# Patient Record
Sex: FEMALE | Race: WHITE | NOT HISPANIC OR LATINO | URBAN - METROPOLITAN AREA
[De-identification: names, ages, dates, MRNs, and addresses within clinical notes are randomized per-mention and may not be internally consistent; named-entity substitution may affect disease eponyms.]

---

## 2017-04-03 ENCOUNTER — TRANSFERRED RECORDS (OUTPATIENT)
Dept: HEALTH INFORMATION MANAGEMENT | Facility: CLINIC | Age: 32
End: 2017-04-03

## 2017-05-05 PROCEDURE — 00000346 ZZHCL STATISTIC REVIEW OUTSIDE SLIDES TC 88321: Performed by: OBSTETRICS & GYNECOLOGY

## 2017-05-09 ASSESSMENT — ENCOUNTER SYMPTOMS
POOR WOUND HEALING: 0
SKIN CHANGES: 1
NAIL CHANGES: 0

## 2017-05-12 PROCEDURE — 00000346 ZZHCL STATISTIC REVIEW OUTSIDE SLIDES TC 88321: Performed by: OBSTETRICS & GYNECOLOGY

## 2017-05-15 LAB — COPATH REPORT: NORMAL

## 2017-05-18 ENCOUNTER — ONCOLOGY VISIT (OUTPATIENT)
Dept: ONCOLOGY | Facility: CLINIC | Age: 32
End: 2017-05-18
Attending: OBSTETRICS & GYNECOLOGY
Payer: COMMERCIAL

## 2017-05-18 VITALS
RESPIRATION RATE: 16 BRPM | TEMPERATURE: 97.9 F | SYSTOLIC BLOOD PRESSURE: 135 MMHG | HEART RATE: 74 BPM | OXYGEN SATURATION: 100 % | DIASTOLIC BLOOD PRESSURE: 83 MMHG | WEIGHT: 147.4 LBS

## 2017-05-18 DIAGNOSIS — N88.2 STENOSIS OF CERVIX UTERI: ICD-10-CM

## 2017-05-18 DIAGNOSIS — D06.9 ADENOCARCINOMA IN SITU (AIS) OF UTERINE CERVIX: Primary | ICD-10-CM

## 2017-05-18 PROCEDURE — 99204 OFFICE O/P NEW MOD 45 MIN: CPT | Mod: ZP | Performed by: OBSTETRICS & GYNECOLOGY

## 2017-05-18 PROCEDURE — 99213 OFFICE O/P EST LOW 20 MIN: CPT | Mod: ZF

## 2017-05-18 RX ORDER — CETIRIZINE HYDROCHLORIDE 10 MG/1
TABLET ORAL
COMMUNITY
Start: 2015-06-01

## 2017-05-18 RX ORDER — ACETAMINOPHEN 325 MG/1
975 TABLET ORAL ONCE
Status: CANCELLED | OUTPATIENT
Start: 2017-05-18 | End: 2017-05-18

## 2017-05-18 ASSESSMENT — PAIN SCALES - GENERAL: PAINLEVEL: NO PAIN (0)

## 2017-05-18 NOTE — NURSING NOTE
Oncology Rooming Note    May 18, 2017 10:04 AM   Cheyanne Ramos is a 32 year old female who presents for:    Chief Complaint   Patient presents with     Oncology Clinic Visit     abnormal pap     Initial Vitals: /83  Pulse 74  Temp 97.9  F (36.6  C) (Oral)  Resp 16  Wt 66.9 kg (147 lb 6.4 oz)  LMP  (Exact Date)  SpO2 100%  Breastfeeding? No There is no height or weight on file to calculate BMI. There is no height or weight on file to calculate BSA.  No Pain (0) Comment: Data Unavailable   No LMP recorded (exact date).  Allergies reviewed: Yes  Medications reviewed: Yes    Medications: Medication refills not needed today.  Pharmacy name entered into EPIC: Data Unavailable    Clinical concerns: Pt here to discuss previous abnormal pap, has been trying to get pregnant for 2 years without success. Dr Crow was notified.    8 minutes for nursing intake (face to face time)     Linda Garibay CMA

## 2017-05-18 NOTE — NURSING NOTE
Pre Op Nurse Teaching Template    Relevant Diagnosis: cervical stenosis    Teaching Topic: cervical dilatation under u/s guidance place daniel sleeve endocervical curettage    Person(s) involved in teaching :  Patient    Motivation Level:  Asks Questions:    Yes      Eager to Learn:     Yes     Cooperative:          Yes    Receptive (willing. Able to accept information):    Yes      Patient and those who are listed above demonstrates understanding of the following:   Reason for the appointment, diagnosis and treatment plan:   Yes   Knowledge of proper use of medications and conditions for which they are ordered (with special attention to potential side effects or drug interactions): Yes   Which situations necessitate calling provider and whom to contact: Yes         Nutritional needs and diet plan:  Yes      Pain management techniques:     Yes, Pain Scale   Diet:   Yes, [facility] Diet Instructions    Teaching Concerns addressed: Yes    Infection Prevention:  Patient and those who are listed above demonstrate understanding of the following:  Pre-Op CHG Bathing Instructions: Yes  Surgical procedure site care taught:   Yes   Signs and symptoms of infection taught: Yes       Instructional Materials Used/Given:  The [place] Before You Surgery Booklet  Showering or Bathing before Surgery Instructions & CHG Product  Hysterectomy Guidelines  Pain Assessment Tool   Home Care after Major Abdominal or Vaginal Surgery  Map  Accommodations Brochure  Phone numbers for [facility] and [facility]  Copy of Surgical Consent    Done Today:   Preop Visit not needed   Tests Ordered:  Post Op Visit Scheduled:  6/22  Comments:    Surgery date/time:6/15    Consent sent to file in medical records

## 2017-05-18 NOTE — MR AVS SNAPSHOT
After Visit Summary   5/18/2017    Cheyanne Ramos    MRN: [id number]           Patient Information     Date Of Birth          1985        Visit Information        Provider Department      5/18/2017 10:00 AM Key Crow MD [facility]        Today's Diagnoses     Adenocarcinoma in situ (AIS) of uterine cervix    -  1    Stenosis of cervix uteri           Follow-ups after your visit        Your next 10 appointments already scheduled     Jun 22, 2017  3:20 PM CDT   (Arrive by 3:05 PM)   Return Visit with Key Crow MD   [facility] ([facility])    [address]  [address]nd Floor  [place] [address] [phone]              Who to contact     If you have questions or need follow up information about today's clinic visit or your schedule please contact [facility] directly at [phone].  Normal or non-critical lab and imaging results will be communicated to you by MyChart, letter or phone within 4 business days after the clinic has received the results. If you do not hear from us within 7 days, please contact the clinic through [email or web address] or phone. If you have a critical or abnormal lab result, we will notify you by phone as soon as possible.  Submit refill requests through EMUZE or call your pharmacy and they will forward the refill request to us. Please allow 3 business days for your refill to be completed.          Additional Information About Your Visit        MyChart Information     EMUZE gives you secure access to your electronic health record. If you see a primary care provider, you can also send messages to your care team and make appointments. If you have questions, please call your primary care clinic.  If you do not have a primary care provider, please call [phone] and they will assist you.        Care EveryWhere ID     This is your Care EveryWhere ID. This could be used by  other organizations to access your [facility] medical records  [id number]        Your Vitals Were     Pulse Temperature Respirations Last Period Pulse Oximetry Breastfeeding?    74 97.9  F (36.6  C) (Oral) 16 (Exact Date) 100% No       Blood Pressure from Last 3 Encounters:   06/15/17 115/76   05/18/17 135/83    Weight from Last 3 Encounters:   06/15/17 63.5 kg (140 lb)   05/18/17 66.9 kg (147 lb 6.4 oz)              We Performed the Following     Bruna-Operative Worksheet (Blank)        Primary Care Provider    Unknown Primary MD Johnny       No address on file        Thank you!     Thank you for choosing [facility]  for your care. Our goal is always to provide you with excellent care. Hearing back from our patients is one way we can continue to improve our services. Please take a few minutes to complete the written survey that you may receive in the mail after your visit with us. Thank you!             Your Updated Medication List - Protect others around you: Learn how to safely use, store and throw away your medicines at www.disposemymeds.org.          This list is accurate as of: 5/18/17 11:59 PM.  Always use your most recent med list.                   Brand Name Dispense Instructions for use    cetirizine 10 MG tablet    zyrTEC         ibuprofen 600 MG tablet    ADVIL/MOTRIN    30 tablet    Take 1 tablet (600 mg) by mouth every 6 hours as needed for pain (mild)

## 2017-05-18 NOTE — LETTER
2017       RE: Cheyanne Ramos  [address] [address]     Dear Colleague,    Thank you for referring your patient, Cheyanne Ramos, to the [facility]. Please see a copy of my visit note below.                            Consult Notes on Referred Patient    Date: 2017       Dr. Racheal Ribera  [facility] [address]  [place] [address]       RE: Cheyanne Ramos  : 1985  LATHA: 2017     Dear Dr. Racheal Ribera:    I had the pleasure of seeing your patient Cheyanne Ramos here at the Gynecologic Cancer Clinic at the [facility] on 2017.  As you know she is a very pleasant 32 year old woman with a history of AIS and KIKE III in  removed on cold knife cone with negative margins. She has been followed with q 6 months colposcopy and pap smears since that time however it has been difficult to assess her endocervix given cervical stenosis post cold cone.  Given these findings she was subsequently sent to the Gynecologic Cancer Clinic for new patient consultation.     Gynecologic Oncology History:   2014: Pap smear with HSIL, Cold knife cone on 9/15/17 demonstrated AIS with KIKE III, endocervical and ectocervical margins free. EMB at that time showed benign endometrium. Since has been followed with q 6 colposcopy and pap smear. ECC unable to be obtained due to cervical stenosis post CKC.     Today: Feeling well, no abdominal pain, bloating, swelling, nausea or vomiting. No issues, has never had abnormal bleeding since her abnormal pap smear/cold knife cone. Would like to discuss if pregnancy is something she should continue pursuing or consider hysterectomy.     Obstectric and Gynecologic History:  Pap smears: Normal until  when noted to have HSIL. Since CKC in  has had normal pap smears and colposcopy q 6 months.   Menstrual History: Menarche at age 14, regular menses since every 28 days. No bleeding with intercourse. Has  had regular periods since her [facility].     Review of Systems:  Answers for HPI/ROS submitted by the patient on 5/9/2017   General Symptoms: No  Skin Symptoms: Yes  HENT Symptoms: No  EYE SYMPTOMS: No  HEART SYMPTOMS: No  LUNG SYMPTOMS: No  INTESTINAL SYMPTOMS: No  URINARY SYMPTOMS: No  GYNECOLOGIC SYMPTOMS: No  BREAST SYMPTOMS: No  SKELETAL SYMPTOMS: No  BLOOD SYMPTOMS: No  NERVOUS SYSTEM SYMPTOMS: No  MENTAL HEALTH SYMPTOMS: No  Changes in hair: No  Changes in moles/birth marks: Yes  Itching: No  Rashes: No  Changes in nails: No  Acne: No  Hair in places you don't want it: No  Change in facial hair: No  Warts: No  Non-healing sores: No  Scarring: No  Flaking of skin: No  Color changes of hands/feet in cold : No  Sun sensitivity: No  Skin thickening: No      Past Medical History:    No past medical history on file.      Past Surgical History:    No past surgical history on file.      Health Maintenance:  Health Maintenance Due   Topic Date Due     TETANUS IMMUNIZATION (SYSTEM ASSIGNED)  04/13/2005     PAP SCREENING Q3 YR (SYSTEM ASSIGNED)  04/13/2008       Last Pap Smear: No results found for: PAP  She has had a history of abnormal Pap smears.    Current Medications:     currently has no medications in their medication list.       Allergies:      Allergies not on file        Social History:     Social History   Substance Use Topics     Smoking status: Not on file     Smokeless tobacco: Not on file     Alcohol use Not on file       History   Drug Use Not on file           Family History:     The patient's family history is unremarkable.    No family history on file.      Physical Exam:     There were no vitals taken for this visit.  There is no height or weight on file to calculate BMI.    General Appearance: healthy and alert, no distress     HEENT:  no thyromegaly, no palpable nodules or masses        Cardiovascular: regular rate and rhythm, no gallops, rubs or murmurs     Respiratory: lungs clear, no rales, rhonchi or  wheezes, normal diaphragmatic excursion    Musculoskeletal: extremities non tender and without edema    Skin: no lesions or rashes     Neurological: normal gait, no gross defects     Psychiatric: appropriate mood and affect                               Hematological: normal cervical, supraclavicular and inguinal lymph nodes     Gastrointestinal:       abdomen soft, non-tender, non-distended, no organomegaly or masses    Genitourinary: External genitalia and urethral meatus appears normal.  Vagina is smooth without nodularity or masses.  Cervix appears stenotic, slight leftward deviation. Bimanual exam reveal no masses, nodularity or fullness.  Recto-vaginal exam confirms these findings.       Assessment:    Cheyanne Ramos is a 32 year old woman with a history of AIS, KIKE III diagnosed on [facility] 9/2014 with negative endo and ectocervical margins.     \      Plan:     1.)   History of AIS/KIKE III: AIS and KIKE III diagnosed on [facility] in 9/2014 with negative margins, has been followed appropriately with q 6 months colposcopy and pap smears which have been normal. There is concern the endocervix has not been adequately sampled with this in addition to her trying to become pregnant for the past ~ 9 months. Cervical stenosis appreciated on cervical exam today, unclear if this is contributing to her inability to become pregnant. Discussed referral to [facility] which patient will consider. At this time, feel it is reasonable to proceed with cervical dilation to attempt to open up her cervical os for more adequate endocervical sampling, potentially this could help with her desire to become pregnant as well but can't say for certain. Will schedule her for cervical dilation under US guidance with placement of a obey sleeve. Discussed risks and benefits of this procedure, all questions answered. I did discuss recommendation for eventual hysterectomy in patients with history of AIS, however given her desire for pregnancy will proceed with  procedure to attempt better sampling of the endocervix and continue monitoring thereafter.    2.) Genetic risk factors were assessed and the patient does not meet the qualifications for a referral.      3.) Labs and/or tests ordered include:  None.     4.) Health maintenance issues addressed today include need for continued surveillance.    5.) Pre-op teaching was completed today.  Risks of surgery were discussed to include: bleeding, transfusion, infection, unintentional injury to surrounding organs/structures.      Thank you for allowing us to participate in the care of your patient.         Sincerely,    Key Crow MD    Department of Ob/Gyn and Women's Health  Division of Gynecologic Oncology  [facility]  [phone]      CC  Patient Care Team:  Primary Rai Turner MD as PCP - FRANCISCA Berger

## 2017-05-18 NOTE — PROGRESS NOTES
Consult Notes on Referred Patient    Date: 2017       Dr. Racheal Ribera  [employer] [address] [address]       RE: Cheyanne Ramos  : 1985  LATHA: 2017     Dear Dr. Racheal Ribera:    I had the pleasure of seeing your patient Cheyanne Ramos here at the Gynecologic Cancer Clinic at the [facility] on 2017.  As you know she is a very pleasant 32 year old woman with a history of AIS and KIKE III in  removed on cold knife cone with negative margins. She has been followed with q 6 months colposcopy and pap smears since that time however it has been difficult to assess her endocervix given cervical stenosis post cold cone.  Given these findings she was subsequently sent to the Gynecologic Cancer Clinic for new patient consultation.     Gynecologic Oncology History:   2014: Pap smear with HSIL, Cold knife cone on 9/15/17 demonstrated AIS with KIKE III, endocervical and ectocervical margins free. EMB at that time showed benign endometrium. Since has been followed with q 6 colposcopy and pap smear. ECC unable to be obtained due to cervical stenosis post CKC.     Today: Feeling well, no abdominal pain, bloating, swelling, nausea or vomiting. No issues, has never had abnormal bleeding since her abnormal pap smear/cold knife cone. Would like to discuss if pregnancy is something she should continue pursuing or consider hysterectomy.     Obstectric and Gynecologic History:  Pap smears: Normal until  when noted to have HSIL. Since CKC in  has had normal pap smears and colposcopy q 6 months.   Menstrual History: Menarche at age 14, regular menses since every 28 days. No bleeding with intercourse. Has had regular periods since her CKC.     Review of Systems:  Answers for HPI/ROS submitted by the patient on 2017   General Symptoms: No  Skin Symptoms: Yes  HENT Symptoms: No  EYE SYMPTOMS: No  HEART SYMPTOMS: No  LUNG SYMPTOMS:  No  INTESTINAL SYMPTOMS: No  URINARY SYMPTOMS: No  GYNECOLOGIC SYMPTOMS: No  BREAST SYMPTOMS: No  SKELETAL SYMPTOMS: No  BLOOD SYMPTOMS: No  NERVOUS SYSTEM SYMPTOMS: No  MENTAL HEALTH SYMPTOMS: No  Changes in hair: No  Changes in moles/birth marks: Yes  Itching: No  Rashes: No  Changes in nails: No  Acne: No  Hair in places you don't want it: No  Change in facial hair: No  Warts: No  Non-healing sores: No  Scarring: No  Flaking of skin: No  Color changes of hands/feet in cold : No  Sun sensitivity: No  Skin thickening: No      Past Medical History:    No past medical history on file.      Past Surgical History:    No past surgical history on file.      Health Maintenance:  Health Maintenance Due   Topic Date Due     TETANUS IMMUNIZATION (SYSTEM ASSIGNED)  04/13/2005     PAP SCREENING Q3 YR (SYSTEM ASSIGNED)  04/13/2008       Last Pap Smear: No results found for: PAP  She has had a history of abnormal Pap smears.    Current Medications:     currently has no medications in their medication list.       Allergies:      Allergies not on file        Social History:     Social History   Substance Use Topics     Smoking status: Not on file     Smokeless tobacco: Not on file     Alcohol use Not on file       History   Drug Use Not on file           Family History:     The patient's family history is unremarkable.    No family history on file.      Physical Exam:     There were no vitals taken for this visit.  There is no height or weight on file to calculate BMI.    General Appearance: healthy and alert, no distress     HEENT:  no thyromegaly, no palpable nodules or masses        Cardiovascular: regular rate and rhythm, no gallops, rubs or murmurs     Respiratory: lungs clear, no rales, rhonchi or wheezes, normal diaphragmatic excursion    Musculoskeletal: extremities non tender and without edema    Skin: no lesions or rashes     Neurological: normal gait, no gross defects     Psychiatric: appropriate mood and affect                                Hematological: normal cervical, supraclavicular and inguinal lymph nodes     Gastrointestinal:       abdomen soft, non-tender, non-distended, no organomegaly or masses    Genitourinary: External genitalia and urethral meatus appears normal.  Vagina is smooth without nodularity or masses.  Cervix appears stenotic, slight leftward deviation. Bimanual exam reveal no masses, nodularity or fullness.  Recto-vaginal exam confirms these findings.       Assessment:    Cheyanne Ramos is a 32 year old woman with a history of AIS, KIKE III diagnosed on [facility] 9/2014 with negative endo and ectocervical margins.     \      Plan:     1.)   History of AIS/KIKE III: AIS and KIKE III diagnosed on [facility] in 9/2014 with negative margins, has been followed appropriately with q 6 months colposcopy and pap smears which have been normal. There is concern the endocervix has not been adequately sampled with this in addition to her trying to become pregnant for the past ~ 9 months. Cervical stenosis appreciated on cervical exam today, unclear if this is contributing to her inability to become pregnant. Discussed referral to [facility] which patient will consider. At this time, feel it is reasonable to proceed with cervical dilation to attempt to open up her cervical os for more adequate endocervical sampling, potentially this could help with her desire to become pregnant as well but can't say for certain. Will schedule her for cervical dilation under US guidance with placement of a obey sleeve. Discussed risks and benefits of this procedure, all questions answered. I did discuss recommendation for eventual hysterectomy in patients with history of AIS, however given her desire for pregnancy will proceed with procedure to attempt better sampling of the endocervix and continue monitoring thereafter.    2.) Genetic risk factors were assessed and the patient does not meet the qualifications for a referral.      3.) Labs and/or tests ordered  include:  None.     4.) Health maintenance issues addressed today include need for continued surveillance.    5.) Pre-op teaching was completed today.  Risks of surgery were discussed to include: bleeding, transfusion, infection, unintentional injury to surrounding organs/structures.      Thank you for allowing us to participate in the care of your patient.         Sincerely,    Key Crow MD    Department of Ob/Gyn and Women's Health  Division of Gynecologic Oncology  [facility]  [phone]      CC  Patient Care Team:  Primary Rai Turner MD as PCP - FRANCISCA Berger

## 2017-05-20 LAB
COPATH REPORT: NORMAL
COPATH REPORT: NORMAL

## 2017-06-14 ENCOUNTER — ANESTHESIA EVENT (OUTPATIENT)
Dept: SURGERY | Facility: AMBULATORY SURGERY CENTER | Age: 32
End: 2017-06-14

## 2017-06-15 ENCOUNTER — ANESTHESIA (OUTPATIENT)
Dept: SURGERY | Facility: AMBULATORY SURGERY CENTER | Age: 32
End: 2017-06-15

## 2017-06-15 ENCOUNTER — HOSPITAL ENCOUNTER (OUTPATIENT)
Facility: AMBULATORY SURGERY CENTER | Age: 32
End: 2017-06-15
Attending: OBSTETRICS & GYNECOLOGY

## 2017-06-15 ENCOUNTER — SURGERY (OUTPATIENT)
Age: 32
End: 2017-06-15

## 2017-06-15 VITALS
BODY MASS INDEX: 19.6 KG/M2 | SYSTOLIC BLOOD PRESSURE: 111 MMHG | RESPIRATION RATE: 18 BRPM | HEIGHT: 71 IN | OXYGEN SATURATION: 97 % | TEMPERATURE: 97.7 F | WEIGHT: 140 LBS | HEART RATE: 76 BPM | DIASTOLIC BLOOD PRESSURE: 85 MMHG

## 2017-06-15 DIAGNOSIS — N88.2 STENOSIS OF CERVIX UTERI: ICD-10-CM

## 2017-06-15 DIAGNOSIS — D06.9 ADENOCARCINOMA IN SITU (AIS) OF UTERINE CERVIX: ICD-10-CM

## 2017-06-15 LAB
HCG UR QL: NEGATIVE
INTERNAL QC OK POCT: NO

## 2017-06-15 RX ORDER — SODIUM CHLORIDE, SODIUM LACTATE, POTASSIUM CHLORIDE, CALCIUM CHLORIDE 600; 310; 30; 20 MG/100ML; MG/100ML; MG/100ML; MG/100ML
INJECTION, SOLUTION INTRAVENOUS CONTINUOUS
Status: DISCONTINUED | OUTPATIENT
Start: 2017-06-15 | End: 2017-06-16 | Stop reason: HOSPADM

## 2017-06-15 RX ORDER — ONDANSETRON 4 MG/1
4 TABLET, ORALLY DISINTEGRATING ORAL EVERY 30 MIN PRN
Status: DISCONTINUED | OUTPATIENT
Start: 2017-06-15 | End: 2017-06-16 | Stop reason: HOSPADM

## 2017-06-15 RX ORDER — SODIUM CHLORIDE, SODIUM LACTATE, POTASSIUM CHLORIDE, CALCIUM CHLORIDE 600; 310; 30; 20 MG/100ML; MG/100ML; MG/100ML; MG/100ML
INJECTION, SOLUTION INTRAVENOUS CONTINUOUS
Status: DISCONTINUED | OUTPATIENT
Start: 2017-06-15 | End: 2017-06-15 | Stop reason: HOSPADM

## 2017-06-15 RX ORDER — FENTANYL CITRATE 50 UG/ML
25-50 INJECTION, SOLUTION INTRAMUSCULAR; INTRAVENOUS EVERY 5 MIN PRN
Status: DISCONTINUED | OUTPATIENT
Start: 2017-06-15 | End: 2017-06-15 | Stop reason: HOSPADM

## 2017-06-15 RX ORDER — NALOXONE HYDROCHLORIDE 0.4 MG/ML
.1-.4 INJECTION, SOLUTION INTRAMUSCULAR; INTRAVENOUS; SUBCUTANEOUS
Status: DISCONTINUED | OUTPATIENT
Start: 2017-06-15 | End: 2017-06-16 | Stop reason: HOSPADM

## 2017-06-15 RX ORDER — GABAPENTIN 300 MG/1
300 CAPSULE ORAL ONCE
Status: COMPLETED | OUTPATIENT
Start: 2017-06-15 | End: 2017-06-15

## 2017-06-15 RX ORDER — MEPERIDINE HYDROCHLORIDE 25 MG/ML
12.5 INJECTION INTRAMUSCULAR; INTRAVENOUS; SUBCUTANEOUS
Status: DISCONTINUED | OUTPATIENT
Start: 2017-06-15 | End: 2017-06-16 | Stop reason: HOSPADM

## 2017-06-15 RX ORDER — ACETAMINOPHEN 325 MG/1
975 TABLET ORAL ONCE
Status: COMPLETED | OUTPATIENT
Start: 2017-06-15 | End: 2017-06-15

## 2017-06-15 RX ORDER — IBUPROFEN 200 MG
600 TABLET ORAL
Status: DISCONTINUED | OUTPATIENT
Start: 2017-06-15 | End: 2017-06-16 | Stop reason: HOSPADM

## 2017-06-15 RX ORDER — ACETAMINOPHEN 325 MG/1
975 TABLET ORAL ONCE
Status: DISCONTINUED | OUTPATIENT
Start: 2017-06-15 | End: 2017-06-15 | Stop reason: HOSPADM

## 2017-06-15 RX ORDER — LIDOCAINE 40 MG/G
CREAM TOPICAL
Status: DISCONTINUED | OUTPATIENT
Start: 2017-06-15 | End: 2017-06-15 | Stop reason: HOSPADM

## 2017-06-15 RX ORDER — PROPOFOL 10 MG/ML
INJECTION, EMULSION INTRAVENOUS CONTINUOUS PRN
Status: DISCONTINUED | OUTPATIENT
Start: 2017-06-15 | End: 2017-06-15

## 2017-06-15 RX ORDER — BUPIVACAINE HYDROCHLORIDE 5 MG/ML
INJECTION, SOLUTION EPIDURAL; INTRACAUDAL PRN
Status: DISCONTINUED | OUTPATIENT
Start: 2017-06-15 | End: 2017-06-15 | Stop reason: HOSPADM

## 2017-06-15 RX ORDER — LIDOCAINE HYDROCHLORIDE 20 MG/ML
INJECTION, SOLUTION INFILTRATION; PERINEURAL PRN
Status: DISCONTINUED | OUTPATIENT
Start: 2017-06-15 | End: 2017-06-15

## 2017-06-15 RX ORDER — FENTANYL CITRATE 50 UG/ML
25-50 INJECTION, SOLUTION INTRAMUSCULAR; INTRAVENOUS
Status: DISCONTINUED | OUTPATIENT
Start: 2017-06-15 | End: 2017-06-15 | Stop reason: HOSPADM

## 2017-06-15 RX ORDER — HYDROCODONE BITARTRATE AND ACETAMINOPHEN 5; 325 MG/1; MG/1
1-2 TABLET ORAL
Status: DISCONTINUED | OUTPATIENT
Start: 2017-06-15 | End: 2017-06-16 | Stop reason: HOSPADM

## 2017-06-15 RX ORDER — IBUPROFEN 600 MG/1
600 TABLET, FILM COATED ORAL EVERY 6 HOURS PRN
Qty: 30 TABLET | Refills: 0 | Status: SHIPPED | OUTPATIENT
Start: 2017-06-15 | End: 2022-07-12

## 2017-06-15 RX ORDER — KETOROLAC TROMETHAMINE 30 MG/ML
INJECTION, SOLUTION INTRAMUSCULAR; INTRAVENOUS PRN
Status: DISCONTINUED | OUTPATIENT
Start: 2017-06-15 | End: 2017-06-15

## 2017-06-15 RX ORDER — ONDANSETRON 2 MG/ML
4 INJECTION INTRAMUSCULAR; INTRAVENOUS EVERY 30 MIN PRN
Status: DISCONTINUED | OUTPATIENT
Start: 2017-06-15 | End: 2017-06-16 | Stop reason: HOSPADM

## 2017-06-15 RX ORDER — DEXAMETHASONE SODIUM PHOSPHATE 4 MG/ML
INJECTION, SOLUTION INTRA-ARTICULAR; INTRALESIONAL; INTRAMUSCULAR; INTRAVENOUS; SOFT TISSUE PRN
Status: DISCONTINUED | OUTPATIENT
Start: 2017-06-15 | End: 2017-06-15

## 2017-06-15 RX ORDER — ONDANSETRON 2 MG/ML
INJECTION INTRAMUSCULAR; INTRAVENOUS PRN
Status: DISCONTINUED | OUTPATIENT
Start: 2017-06-15 | End: 2017-06-15

## 2017-06-15 RX ORDER — GLYCOPYRROLATE 0.2 MG/ML
INJECTION, SOLUTION INTRAMUSCULAR; INTRAVENOUS PRN
Status: DISCONTINUED | OUTPATIENT
Start: 2017-06-15 | End: 2017-06-15

## 2017-06-15 RX ADMIN — DEXAMETHASONE SODIUM PHOSPHATE 4 MG: 4 INJECTION, SOLUTION INTRA-ARTICULAR; INTRALESIONAL; INTRAMUSCULAR; INTRAVENOUS; SOFT TISSUE at 07:20

## 2017-06-15 RX ADMIN — GLYCOPYRROLATE 0.2 MCG: 0.2 INJECTION, SOLUTION INTRAMUSCULAR; INTRAVENOUS at 07:49

## 2017-06-15 RX ADMIN — ACETAMINOPHEN 975 MG: 325 TABLET ORAL at 06:13

## 2017-06-15 RX ADMIN — SODIUM CHLORIDE, SODIUM LACTATE, POTASSIUM CHLORIDE, CALCIUM CHLORIDE: 600; 310; 30; 20 INJECTION, SOLUTION INTRAVENOUS at 07:13

## 2017-06-15 RX ADMIN — KETOROLAC TROMETHAMINE 30 MG: 30 INJECTION, SOLUTION INTRAMUSCULAR; INTRAVENOUS at 07:48

## 2017-06-15 RX ADMIN — GABAPENTIN 300 MG: 300 CAPSULE ORAL at 06:13

## 2017-06-15 RX ADMIN — LIDOCAINE HYDROCHLORIDE 100 MG: 20 INJECTION, SOLUTION INFILTRATION; PERINEURAL at 07:17

## 2017-06-15 RX ADMIN — BUPIVACAINE HYDROCHLORIDE 10 ML: 5 INJECTION, SOLUTION EPIDURAL; INTRACAUDAL at 07:39

## 2017-06-15 RX ADMIN — PROPOFOL 200 MCG/KG/MIN: 10 INJECTION, EMULSION INTRAVENOUS at 07:17

## 2017-06-15 RX ADMIN — ONDANSETRON 4 MG: 2 INJECTION INTRAMUSCULAR; INTRAVENOUS at 07:20

## 2017-06-15 NOTE — ANESTHESIA PREPROCEDURE EVALUATION
Anesthesia Evaluation     . Pt has had prior anesthetic.     History of anesthetic complications   - PONV        ROS/MED HX    ENT/Pulmonary:  - neg pulmonary ROS     Neurologic:  - neg neurologic ROS     Cardiovascular:  - neg cardiovascular ROS       METS/Exercise Tolerance:  >4 METS   Hematologic:  - neg hematologic  ROS       Musculoskeletal:  - neg musculoskeletal ROS       GI/Hepatic:  - neg GI/hepatic ROS       Renal/Genitourinary:  - ROS Renal section negative       Endo:         Psychiatric:         Infectious Disease:  - neg infectious disease ROS       Malignancy:      - no malignancy   Other:    - neg other ROS                 Physical Exam      Airway   Mallampati: I  TM distance: >3 FB  Neck ROM: full    Dental     Cardiovascular   Rhythm and rate: regular      Pulmonary    breath sounds clear to auscultation                    Anesthesia Plan      History & Physical Review  History and physical reviewed and following examination; no interval change.    ASA Status:  2 .        Plan for MAC Reason for MAC:  Deep or markedly invasive procedure (G8)  PONV prophylaxis:  Ondansetron (or other 5HT-3) and Dexamethasone or Solumedrol       Postoperative Care  Postoperative pain management:  IV analgesics.      Consents  Anesthetic plan, risks, benefits and alternatives discussed with:  Patient..                          .

## 2017-06-15 NOTE — BRIEF OP NOTE
[facility] Gynecology Brief Operative Note    Pre-operative diagnosis: Cervical stenosis, hx of adenocarcinoma of the cervix, desires fertility   Post-operative diagnosis: Same   Procedure: EUA, ECC, placement of daniel sleeve   Surgeon: Key Crow MD   Assistant(s): Rayne Garces MS   Anesthesia: MAC (monitored anesthesia care), 10 cc 5% marcaine   Estimated blood loss: less than 50ml   Total IV fluids: (See anesthesia record)   Blood transfusion: No transfusion was given during surgery   Total urine output: (See anesthesia record)   Drains: daniel sleeve in cervix   Specimens: ECC, pap smear   Findings:  cervical stenosis, uterus anteverted, able to be dilated following incision with scalpel. No abnormal cervical findings. Daniel sleeve tagged to cervix with 3 sutures.   Complications: None   Condition: Stable   Comments: See dictated operative report for full details    Key Crow MD    Department of Ob/Gyn and Women's Health  Division of Gynecologic Oncology  [facility]  [phone]

## 2017-06-15 NOTE — OP NOTE
IDENTIFICATION:  Cheyanne Ramos is a 32-year-old  female with a history of adenocarcinoma in situ and CIN3 in 2014, at which point she underwent a cold knife conization with negative margins.  She has been followed with every 6 months colposcopy and Pap smears since that time.  However, it has been very difficult to assess her endocervix due to the fact that she has severe cervical stenosis, status post cold knife conization.  Based on this, the patient presented to me in consultation.  With this diagnosis, I agreed that there is concern that the endocervical has not been adequately sampled.  In addition to this, the patient has been trying to get pregnant for the past 9 months.  Cervical stenosis was appreciated on examination, and at this point I do recommend that the patient undergo dilatation of the cervical canal and performance of the endocervical curettings.  I also discussed placement of a Connor Sleeve in order to attempt to keep the canal opened.  The risks of the procedure including risks of infection, bleeding, damage to surrounding organs were explained in detail and informed consent was obtained.  The patient was taken to the operating room on 06/15/2017 at the [facility].      PREOPERATIVE DIAGNOSIS:  Cervical stenosis, history of adenocarcinoma in situ of the cervix as well as CIN3, desires fertility.      POSTOPERATIVE DIAGNOSIS:  Cervical stenosis, history of adenocarcinoma in situ of the cervix as well as CIN3, desires fertility.      PROCEDURE:  Exam under anesthesia, dilatation of cervical canal, endocervical curettings and Pap smear, placement of Connor sleeve.      SURGEON:  Key Crow MD      ASSISTANTS:  Chelsey Garces, medical student.      ANESTHESIA:  MAC with 10 mL of 0.5% Marcaine intracervical.      ESTIMATED BLOOD LOSS:  20 mL.      IV FLUIDS:  See anesthesia record.      BLOOD TRANSFUSION:  None.      TOTAL URINE OUTPUT:  The urine was drained from the Cardenas at  the end of the procedure.      SPECIMENS REMOVED:  ECC and Pap.      FINDINGS:  Cervical stenosis, uterus very anteverted, cervix was able to be dilated following incision with a scalpel.  There were no abnormal cervical findings.  The Connor Sleeve was tagged to the cervix with 3 sutures.      COMPLICATIONS:  None.      CONDITION:  Stable to PACU.      PROCEDURE IN DETAIL:  The patient was taken to the operating room with IV fluids running where she was placed in supine position and administered MAC anesthesia without difficulty.  She was then prepped and draped in the usual sterile fashion.  Her legs in Damaso stirrups.  An exam under anesthesia was performed with findings as noted above.  She was then prepped and draped in sterile fashion.  A sterile speculum was placed into the vagina.  Ultrasound was present to allow for dilatation of the cervix and ablation of cervical stenosis under ultrasound guidance.  A Cardenas catheter was placed in the bladder and approximately 180 mL of sterile saline were placed into the bladder.  This will allow for better visualization of the uterus.  At this point, an #11 blade was taken to price the cervix following infiltration of 10 mL of 0.5% Marcaine without epinephrine.  Minimal bleeding was noted.  Following this, an attempt was made to sound through and dilate up the endocervical canal.  I was unable to do this with the sound, so I did take the Hylton dilator and was ultimately able to find a canal through the endocervix and up into the fundus of the uterus.  I confirmed this under ultrasound guidance.  At this point, I was able to increase the size of the endocervical canal sequentially with the dilators and an ECC and Pap smear were performed.  These were sent off to pathology.  At this point, I cut the end of the Connor Sleeve off and placed it into the endocervical canal and tagged it down with 3 sutures of 2-0 Vicryl.  The Connor sleeve stayed in place.  Minimal bleeding was  noted and was stopped at the end of the procedure.  All instruments and sponge counts were reported as correct x2 to me at the end of the procedure.  The patient was taken out of the dorsal lithotomy position and taken to PACU in stable condition.      Please note a timeout was called prior to beginning any procedure.         MD Key STRICKLAND MD    Department of Ob/Gyn and Women's Health  Division of Gynecologic Oncology  [facility]  [phone]       D: 06/15/2017 09:06   T: 06/15/2017 13:53   MT: maris      Name:     FILIPE BLANCO   MRN:      [id number]        Account:        [id number]   :      1985           Procedure Date: 06/15/2017      Document: [id number]

## 2017-06-15 NOTE — ANESTHESIA POSTPROCEDURE EVALUATION
Patient: Cheyanne Ramos    Procedure(s):  Dilation of Cervical Canal Under Ultrasound Guidance, Endocervical Curettage, Placement of Connor Sleeve and Pap smear - Wound Class: II-Clean Contaminated    Diagnosis:Cervical Stenosis, History of Androgen Insensitivity Syndrome  Diagnosis Additional Information: No value filed.    Anesthesia Type:  MAC    Note:  Anesthesia Post Evaluation    Patient location during evaluation: Phase 2  Patient participation: Able to fully participate in evaluation  Level of consciousness: awake  Pain management: adequate  Airway patency: patent  Cardiovascular status: acceptable  Respiratory status: acceptable  Hydration status: acceptable  PONV: none     Anesthetic complications: None          Last vitals:  Vitals:    06/15/17 0814 06/15/17 0825 06/15/17 0847   BP: 114/78 129/82 111/85   Pulse: 86 72 76   Resp: 16 16 18   Temp: 36.5  C (97.7  F)     SpO2: 96% 97% 97%         Electronically Signed By: Mukund Abbott MD  Jenna 15, 2017  11:32 AM

## 2017-06-15 NOTE — DISCHARGE INSTRUCTIONS
[facility]  Home Care Following Anesthesia  For 24 hours after surgery:  1. Get plenty of rest.  A responsible adult must stay with you for at least 24 hours after you leave the surgery center.  2. Do not drive or use heavy equipment.  If you have weakness or tingling, don't drive or use heavy equipment until this feeling goes away.   3. Do not drink alcohol.   4. Avoid strenuous or risky activities.  Ask for help when climbing stairs.  5. You may feel lightheaded.  IF so, sit for a few minutes before standing.  Have someone help you get up.   6. If you have nausea (feel sick to your stomach): Drink only clear liquids such as apple juice, ginger ale, broth or 7-Up.  Rest may also help.  Be sure to drink enough fluids.  Move to a regular diet as you feel able.   7. You may have a slight fever.  Call the doctor if your fever is over 100 F (37.7 C) (taken under the tongue) or lasts longer than 24 hours.  8. You may have a dry mouth, a sore throat, muscle aches or trouble sleeping. These should go away after 24 hours.  9. Do not make important or legal decisions.       Tips for taking pain medications  To get the best pain relief possible, remember these points:    Take pain medications as directed, before pain becomes severe.    Pain medication can upset your stomach: taking it with food may help.    Constipation is a common side effect of pain medication. Drink plenty of  fluids.    Eat foods high in fiber. Take a stool softener if recommended by your doctor or pharmacist.    Do not drink alcohol, drive or operate machinery while taking pain medications.    Ask about other ways to control pain, such as with heat, ice or relaxation.    Call a doctor for any of the followin. Signs of infection (fever, growing tenderness at the surgery site, a large amount of drainage or bleeding, severe pain, foul-smelling drainage, redness, swelling).  2. It has been over 8 to 10 hours since  surgery and you are still not able to urinate (pass water).  3. Headache for over 24 hours.  4. Numbness, tingling or weakness the day after surgery (if you had spinal anesthesia).  Your doctor is:  Dr. Key Crow, Gynecologic Oncology: [phone]  Or dial [phone] and ask for the resident on call for:  Gynecologic Oncology  For emergency care, call the:  [place] [facility]:  [phone] (TTY for hearing impaired: [phone])

## 2017-06-15 NOTE — IP AVS SNAPSHOT
MRN:[id number]                      After Visit Summary   6/15/2017    Cheyanne Ramos    MRN: [id number]           Thank you!     Thank you for choosing [facility] for your care. Our goal is always to provide you with excellent care. Hearing back from our patients is one way we can continue to improve our services. Please take a few minutes to complete the written survey that you may receive in the mail after you visit with us. Thank you!        Patient Information     Date Of Birth          1985        About your hospital stay     You were admitted on:  Jenna 15, 2017 [facility]    You were discharged on:  Jenna 15, 2017       Who to Call     For medical emergencies, please call 911.  For non-urgent questions about your medical care, please call your primary care provider or clinic, None  For questions related to your surgery, please call your surgery clinic        Attending Provider     Provider Specialty    Key Crow MD Oncology       Primary Care Provider    Unknown Primary MD Johnny      After Care Instructions     Discharge Instructions       Resume pre procedure diet            Discharge Instructions       Patient may return to work POD  #1            Discharge Instructions       Patient may drive beginning POD  #1            Discharge Instructions       Patient to arrange follow up appointment in 1 week            Discharge Instructions       Pelvic Rest. No tampons, douching or intercourse for  2 weeks. Call if Fever > 100.4, increasing abdominal pain, bleeding > 1 pad per hour.            No alcohol       NO ALCOHOL for 24 hours post procedure            No driving or operating machinery       No driving or operating machinery until day after procedure            No lifting       No lifting over 10 pounds and no strenuous physical activity.  For 2 weeks            Shower        Shower on Post-op day  #1.   DO NOT take a  bath                  Your next 10 appointments already scheduled     Jun 22, 2017  3:20 PM CDT   (Arrive by 3:05 PM)   Return Visit with Key Crow MD   [facility] ([facility])    [address] [place] [id number] [phone]              Further instructions from your care team       [facility]  Home Care Following Anesthesia  For 24 hours after surgery:  1. Get plenty of rest.  A responsible adult must stay with you for at least 24 hours after you leave the surgery center.  2. Do not drive or use heavy equipment.  If you have weakness or tingling, don't drive or use heavy equipment until this feeling goes away.   3. Do not drink alcohol.   4. Avoid strenuous or risky activities.  Ask for help when climbing stairs.  5. You may feel lightheaded.  IF so, sit for a few minutes before standing.  Have someone help you get up.   6. If you have nausea (feel sick to your stomach): Drink only clear liquids such as apple juice, ginger ale, broth or 7-Up.  Rest may also help.  Be sure to drink enough fluids.  Move to a regular diet as you feel able.   7. You may have a slight fever.  Call the doctor if your fever is over 100 F (37.7 C) (taken under the tongue) or lasts longer than 24 hours.  8. You may have a dry mouth, a sore throat, muscle aches or trouble sleeping. These should go away after 24 hours.  9. Do not make important or legal decisions.       Tips for taking pain medications  To get the best pain relief possible, remember these points:    Take pain medications as directed, before pain becomes severe.    Pain medication can upset your stomach: taking it with food may help.    Constipation is a common side effect of pain medication. Drink plenty of  fluids.    Eat foods high in fiber. Take a stool softener if recommended by your doctor or pharmacist.    Do not drink alcohol, drive or operate  "machinery while taking pain medications.    Ask about other ways to control pain, such as with heat, ice or relaxation.    Call a doctor for any of the followin. Signs of infection (fever, growing tenderness at the surgery site, a large amount of drainage or bleeding, severe pain, foul-smelling drainage, redness, swelling).  2. It has been over 8 to 10 hours since surgery and you are still not able to urinate (pass water).  3. Headache for over 24 hours.  4. Numbness, tingling or weakness the day after surgery (if you had spinal anesthesia).  Your doctor is:  Dr. Key Crow, Gynecologic Oncology: [phone]  Or dial [phone] and ask for the resident on call for:  Gynecologic Oncology  For emergency care, call the:  [place] [facility]:  [phone] (TTY for hearing impaired: [phone])                Pending Results     Date and Time Order Name Status Description    6/15/2017 0754 Surgical pathology exam In process             Admission Information     Date & Time Provider Department Dept. Phone    6/15/2017 Key Crow MD [facility] [phone]      Your Vitals Were     Blood Pressure Pulse Temperature Respirations Height Weight    129/82 72 97.7  F (36.5  C) (Temporal) 16 1.803 m (5' 11\") 63.5 kg (140 lb)    Last Period Pulse Oximetry BMI (Body Mass Index)             (Exact Date) 97% 19.53 kg/m2         Zinio Information     Zinio gives you secure access to your electronic health record. If you see a primary care provider, you can also send messages to your care team and make appointments. If you have questions, please call your primary care clinic.  If you do not have a primary care provider, please call [phone] and they will assist you.      Zinio is an electronic gateway that provides easy, online access to your medical records. With Zinio, you can request a clinic appointment, read your test results, renew a prescription or " communicate with your care team.     To access your existing account, please contact your [facility] or call [phone] for assistance.        Care EveryWhere ID     This is your Care EveryWhere ID. This could be used by other organizations to access your [facility] medical records  [id number]           Review of your medicines      START taking        Dose / Directions    conjugated estrogens cream   Commonly known as:  PREMARIN   Used for:  Adenocarcinoma in situ (AIS) of uterine cervix, Stenosis of cervix uteri        Dose:  0.5 g   Place 0.5 g vaginally daily   Quantity:  30 g   Refills:  0       ibuprofen 600 MG tablet   Commonly known as:  ADVIL/MOTRIN   Used for:  Adenocarcinoma in situ (AIS) of uterine cervix, Stenosis of cervix uteri        Dose:  600 mg   Take 1 tablet (600 mg) by mouth every 6 hours as needed for pain (mild)   Quantity:  30 tablet   Refills:  0         CONTINUE these medicines which have NOT CHANGED        Dose / Directions    cetirizine 10 MG tablet   Commonly known as:  zyrTEC        Refills:  0            Where to get your medicines      These medications were sent to [facility] [address] [address]    Hours:  TRANSPLANT PHONE NUMBER [phone] Phone:  [phone]     conjugated estrogens cream    ibuprofen 600 MG tablet                Protect others around you: Learn how to safely use, store and throw away your medicines at www.disposemymeds.org.             Medication List: This is a list of all your medications and when to take them. Check marks below indicate your daily home schedule. Keep this list as a reference.      Medications           Morning Afternoon Evening Bedtime As Needed    cetirizine 10 MG tablet   Commonly known as:  zyrTEC                                conjugated estrogens cream   Commonly known as:  PREMARIN   Place 0.5 g  vaginally daily                                ibuprofen 600 MG tablet   Commonly known as:  ADVIL/MOTRIN   Take 1 tablet (600 mg) by mouth every 6 hours as needed for pain (mild)

## 2017-06-15 NOTE — ANESTHESIA CARE TRANSFER NOTE
Patient: Cheyanne Ramos    Procedure(s):  Dilation of Cervical Canal Under Ultrasound Guidance, Endocervical Curettage, Placement of Connor Sleeve and Pap smear - Wound Class: II-Clean Contaminated    Diagnosis: Cervical Stenosis, History of Androgen Insensitivity Syndrome  Diagnosis Additional Information: No value filed.    Anesthesia Type:   MAC     Note:  Airway :Room Air  Patient transferred to:Phase II        Vitals: (Last set prior to Anesthesia Care Transfer)    CRNA VITALS  6/15/2017 0739 - 6/15/2017 0830      6/15/2017             Pulse: 89    SpO2: 98 %    Resp Rate (set): 10                Electronically Signed By: DEJAH Giles CRNA  Jenna 15, 2017  8:30 AM

## 2017-06-19 LAB
COPATH REPORT: NORMAL
PAP: NORMAL

## 2017-06-21 LAB — COPATH REPORT: NORMAL

## 2017-06-22 ENCOUNTER — ONCOLOGY VISIT (OUTPATIENT)
Dept: ONCOLOGY | Facility: CLINIC | Age: 32
End: 2017-06-22
Attending: OBSTETRICS & GYNECOLOGY
Payer: COMMERCIAL

## 2017-06-22 VITALS
SYSTOLIC BLOOD PRESSURE: 123 MMHG | WEIGHT: 148.7 LBS | HEART RATE: 77 BPM | TEMPERATURE: 98.3 F | BODY MASS INDEX: 20.74 KG/M2 | OXYGEN SATURATION: 98 % | DIASTOLIC BLOOD PRESSURE: 78 MMHG | RESPIRATION RATE: 14 BRPM

## 2017-06-22 DIAGNOSIS — D06.9 ADENOCARCINOMA IN SITU (AIS) OF UTERINE CERVIX: ICD-10-CM

## 2017-06-22 DIAGNOSIS — Z09 POSTOP CHECK: Primary | ICD-10-CM

## 2017-06-22 PROCEDURE — 99214 OFFICE O/P EST MOD 30 MIN: CPT | Mod: ZP | Performed by: OBSTETRICS & GYNECOLOGY

## 2017-06-22 PROCEDURE — 99212 OFFICE O/P EST SF 10 MIN: CPT | Mod: ZF

## 2017-06-22 ASSESSMENT — PAIN SCALES - GENERAL: PAINLEVEL: NO PAIN (0)

## 2017-06-22 NOTE — NURSING NOTE
"Oncology Rooming Note    June 22, 2017 3:23 PM   Cheyanne Ramos is a 32 year old female who presents for:    Chief Complaint   Patient presents with     Surgical Followup     Initial Vitals: /78  Pulse 77  Temp 98.3  F (36.8  C) (Oral)  Resp 14  Wt 67.4 kg (148 lb 11.2 oz)  SpO2 98%  BMI 20.74 kg/m2 Estimated body mass index is 20.74 kg/(m^2) as calculated from the following:    Height as of 6/15/17: 1.803 m (5' 11\").    Weight as of this encounter: 67.4 kg (148 lb 11.2 oz). Body surface area is 1.84 meters squared.  No Pain (0) Comment: Data Unavailable   No LMP recorded.  Allergies reviewed: Yes  Medications reviewed: Yes    Medications: Medication refills not needed today.  Pharmacy name entered into EPIC: Data Unavailable    Clinical concerns:      6 minutes for nursing intake (face to face time)     Linda Garibay CMA              "

## 2017-06-22 NOTE — LETTER
2017       RE: Cheyanne Ramos  [address] [place] [address]     Dear Colleague,    Thank you for referring your patient, Cheyanne Ramos, to the [facility]. Please see a copy of my visit note below.                            Consult Notes on Referred Patient    Date: 2017       Dr. Racheal Ribera  [employer] [address]  [place] [address]       RE: Cheyanne Ramos  : 1985  LATHA: 2017     Dear Dr. Racheal Ribera:    I had the pleasure of seeing your patient Cheyanne Ramos here at the Gynecologic Cancer Clinic at the [facility] on 2017.  As you know she is a very pleasant 32 year old woman with a history of AIS and KIKE III in  removed on cold knife cone with negative margins. She has been followed with q 6 months colposcopy and pap smears since that time however it has been difficult to assess her endocervix given cervical stenosis post cold cone.  Given these findings she was subsequently sent to the Gynecologic Cancer Clinic for new patient consultation.     Gynecologic Oncology History:   2014: Pap smear with HSIL, Cold knife cone on 9/15/17 demonstrated AIS with KIKE III, endocervical and ectocervical margins free. EMB at that time showed benign endometrium. Since has been followed with q 6 colposcopy and pap smear. ECC unable to be obtained due to cervical stenosis post CKC.     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     6/15/2017:   Pap - NIL  ECC - Benign, no hyperplasia or atypia    17 - Daniel sleeve removed     Today: Patient feels well postoperatively. She has had continued bleeding since her cervical dilation/daniel sleeve placement but this is improving. She denies any pain or foul/abnormal discharge. No complaints today. Here  for postoperative check and daniel sleeve removal.     Obstectric and Gynecologic History:  Pap smears: Normal until  when noted to have HSIL. Since [facility] in  has had normal pap smears and colposcopy q 6 months.   Menstrual History: Menarche at age 14, regular menses since every 28 days. No bleeding with intercourse. Has had regular periods since her [facility].     Review of Systems:  Answers for HPI/ROS submitted by the patient on 2017   General Symptoms: No  Skin Symptoms: No  HENT Symptoms: No  EYE SYMPTOMS: No  HEART SYMPTOMS: No  LUNG SYMPTOMS: No  INTESTINAL SYMPTOMS: No  URINARY SYMPTOMS: No  GYNECOLOGIC SYMPTOMS: No  BREAST SYMPTOMS: No  SKELETAL SYMPTOMS: No  BLOOD SYMPTOMS: No  NERVOUS SYSTEM SYMPTOMS: No  MENTAL HEALTH SYMPTOMS: No    I have reviewed and addressed the patient's review of symptoms for today's visit.     Past Medical History:    Past Medical History:   Diagnosis Date     PONV (postoperative nausea and vomiting)          Past Surgical History:    Past Surgical History:   Procedure Laterality Date     BREAST BIOPSY, RT/LT        SECTION       COLPOSCOPY CERVIX, BIOPSY CERVIX, ENDOCERVICAL CURETTAGE, COMBINED       DILATION AND CURETTAGE, WITH ULTRASOUND GUIDANCE N/A 6/15/2017    Procedure: DILATION AND CURETTAGE, WITH ULTRASOUND GUIDANCE;  Dilation of Cervical Canal Under Ultrasound Guidance, Endocervical Curettage, Placement of Daniel Sleeve and Pap smear;  Surgeon: Key Crow MD;  Location:  OR         Health Maintenance:  Health Maintenance Due   Topic Date Due     TETANUS IMMUNIZATION (SYSTEM ASSIGNED)  2005       Last Pap Smear: No results found for: PAP  She has had a history of abnormal Pap smears.    Current Medications:     has a current medication list which includes the following prescription(s): ibuprofen, conjugated estrogens, and cetirizine.       Allergies:        Allergies   Allergen Reactions     Propoxyphene N-Apap Nausea and Vomiting      Metronidazole Rash           Social History:     Social History   Substance Use Topics     Smoking status: Never Smoker     Smokeless tobacco: Not on file     Alcohol use Yes      Comment: occasional       History   Drug Use No           Family History:     The patient's family history is unremarkable.    No family history on file.      Physical Exam:     /78  Pulse 77  Temp 98.3  F (36.8  C) (Oral)  Resp 14  Wt 67.4 kg (148 lb 11.2 oz)  SpO2 98%  BMI 20.74 kg/m2  Body mass index is 20.74 kg/(m^2).    General Appearance: healthy and alert, no distress     HEENT:  no thyromegaly, no palpable nodules or masses        Cardiovascular: regular rate and rhythm, no gallops, rubs or murmurs     Respiratory: lungs clear, no rales, rhonchi or wheezes, normal diaphragmatic excursion    Musculoskeletal: extremities non tender and without edema    Skin: no lesions or rashes     Neurological: normal gait, no gross defects     Psychiatric: appropriate mood and affect                               Hematological: normal cervical, supraclavicular and inguinal lymph nodes     Gastrointestinal:       abdomen soft, non-tender, non-distended, no organomegaly or masses    Genitourinary: External genitalia and urethral meatus appears normal.  Vagina is smooth without nodularity or masses.  Cervix with daniel sleeve in place, removed today. Bleeding from cervical os, appropriate for postoperative period. Cervical os appears patent on exam today. Bimanual exam reveal no masses, nodularity or fullness.      Procedure. Removal of daniel sleeve - Speculum was placed and daniel sleeve was visualized in cervical os. 3 single sutures were removed from daniel sleeve/cervix. Daniel sleeve was gently removed. No complications. Site was visualized without bleeding from suture sites. Blood from cervical os, appropriate for postoperative period.       Assessment:    Cheyanne Ramos is a 32 year old woman with a history of AIS, KIKE III diagnosed on [facility]  9/2014 with negative endo and ectocervical margins, s/p recent cervical dilation, biopsy, and daniel sleeve placement, now s/p daniel sleeve removal. Recent pap and ECC negative for dysplasia or malignancy.    Plan:     1.)   History of AIS/KIKE III: AIS and KIKE III diagnosed on [facility] in 9/2014 with negative margins, has been followed appropriately with q 6 months colposcopy and pap smears, which have been normal. There is concern the endocervix has not been adequately sampled. Patient has also been trying to become pregnant for the past ~ 9 months. Cervical stenosis was appreciated on previous cervical exams, unclear if this was contributing to her inability to become pregnant. Discussed referral to [facility] which patient will consider. Patient is now s/p cervical dilation, endocervical biopsy, and daniel sleeve placement. Daniel sleeve was removed at today's visit without complications.I have previously discussed recommendation for eventual hysterectomy in patients with history of AIS, however given her desire for pregnancy this will be postponed with continued surveillance.  Patient can continue follow up per gynecologist for continued surveillance of AIS/CINIII.     2.) Genetic risk factors were assessed and the patient does not meet the qualifications for a referral.      3.) Labs and/or tests ordered include:  None.     4.) Health maintenance issues addressed today include need for continued surveillance.      Thank you for allowing us to participate in the care of your patient.         Sincerely,    Key Crow MD    Department of Ob/Gyn and Women's Health  Division of Gynecologic Oncology  [facility]  [phone]    Of a 25  minute appointment, more than 50% was spent in counseling the patient.      CC  Patient Care Team:  Primary Rai Turner MD as PCP - General  Amalia Salgado RN as Registered Nurse  FRANCISCA RATLIFF

## 2017-06-22 NOTE — PROGRESS NOTES
Cheyanne,  Endocervical curettage was negative for disease. Good news. Pap was negative.    Key Crow MD    Department of Ob/Gyn and Women's Health  Division of Gynecologic Oncology  [facility]  [phone]

## 2017-06-22 NOTE — PROGRESS NOTES
Consult Notes on Referred Patient    Date: 2017       [facility] [address]       RE: Cheyanne Ramos  : 1985  LATHA: 2017     Dear Dr. Racheal Ribera:    I had the pleasure of seeing your patient Cheyanne Ramos here at the Gynecologic Cancer Clinic at the [facility] on 2017.  As you know she is a very pleasant 32 year old woman with a history of AIS and KIKE III in  removed on cold knife cone with negative margins. She has been followed with q 6 months colposcopy and pap smears since that time however it has been difficult to assess her endocervix given cervical stenosis post cold cone.  Given these findings she was subsequently sent to the Gynecologic Cancer Clinic for new patient consultation.     Gynecologic Oncology History:   2014: Pap smear with HSIL, Cold knife cone on 9/15/17 demonstrated AIS with KIKE III, endocervical and ectocervical margins free. EMB at that time showed benign endometrium. Since has been followed with q 6 colposcopy and pap smear. ECC unable to be obtained due to cervical stenosis post CKC.     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     6/15/2017:   Pap - NIL  ECC - Benign, no hyperplasia or atypia    17 - Daniel sleeve removed     Today: Patient feels well postoperatively. She has had continued bleeding since her cervical dilation/daniel sleeve placement but this is improving. She denies any pain or foul/abnormal discharge. No complaints today. Here for postoperative check and daniel sleeve removal.     Obstectric and Gynecologic History:  Pap smears: Normal until  when noted to have HSIL. Since CKC in  has had normal pap smears and colposcopy q 6 months.   Menstrual  History: Menarche at age 14, regular menses since every 28 days. No bleeding with intercourse. Has had regular periods since her [facility].     Review of Systems:  Answers for HPI/ROS submitted by the patient on 2017   General Symptoms: No  Skin Symptoms: No  HENT Symptoms: No  EYE SYMPTOMS: No  HEART SYMPTOMS: No  LUNG SYMPTOMS: No  INTESTINAL SYMPTOMS: No  URINARY SYMPTOMS: No  GYNECOLOGIC SYMPTOMS: No  BREAST SYMPTOMS: No  SKELETAL SYMPTOMS: No  BLOOD SYMPTOMS: No  NERVOUS SYSTEM SYMPTOMS: No  MENTAL HEALTH SYMPTOMS: No    I have reviewed and addressed the patient's review of symptoms for today's visit.     Past Medical History:    Past Medical History:   Diagnosis Date     PONV (postoperative nausea and vomiting)          Past Surgical History:    Past Surgical History:   Procedure Laterality Date     BREAST BIOPSY, RT/LT        SECTION       COLPOSCOPY CERVIX, BIOPSY CERVIX, ENDOCERVICAL CURETTAGE, COMBINED       DILATION AND CURETTAGE, WITH ULTRASOUND GUIDANCE N/A 6/15/2017    Procedure: DILATION AND CURETTAGE, WITH ULTRASOUND GUIDANCE;  Dilation of Cervical Canal Under Ultrasound Guidance, Endocervical Curettage, Placement of Connor Sleeve and Pap smear;  Surgeon: Key Crow MD;  Location:  OR         Health Maintenance:  Health Maintenance Due   Topic Date Due     TETANUS IMMUNIZATION (SYSTEM ASSIGNED)  2005       Last Pap Smear: No results found for: PAP  She has had a history of abnormal Pap smears.    Current Medications:     has a current medication list which includes the following prescription(s): ibuprofen, conjugated estrogens, and cetirizine.       Allergies:        Allergies   Allergen Reactions     Propoxyphene N-Apap Nausea and Vomiting     Metronidazole Rash           Social History:     Social History   Substance Use Topics     Smoking status: Never Smoker     Smokeless tobacco: Not on file     Alcohol use Yes      Comment: occasional       History   Drug Use No            Family History:     The patient's family history is unremarkable.    No family history on file.      Physical Exam:     /78  Pulse 77  Temp 98.3  F (36.8  C) (Oral)  Resp 14  Wt 67.4 kg (148 lb 11.2 oz)  SpO2 98%  BMI 20.74 kg/m2  Body mass index is 20.74 kg/(m^2).    General Appearance: healthy and alert, no distress     HEENT:  no thyromegaly, no palpable nodules or masses        Cardiovascular: regular rate and rhythm, no gallops, rubs or murmurs     Respiratory: lungs clear, no rales, rhonchi or wheezes, normal diaphragmatic excursion    Musculoskeletal: extremities non tender and without edema    Skin: no lesions or rashes     Neurological: normal gait, no gross defects     Psychiatric: appropriate mood and affect                               Hematological: normal cervical, supraclavicular and inguinal lymph nodes     Gastrointestinal:       abdomen soft, non-tender, non-distended, no organomegaly or masses    Genitourinary: External genitalia and urethral meatus appears normal.  Vagina is smooth without nodularity or masses.  Cervix with daniel sleeve in place, removed today. Bleeding from cervical os, appropriate for postoperative period. Cervical os appears patent on exam today. Bimanual exam reveal no masses, nodularity or fullness.      Procedure. Removal of daniel sleeve - Speculum was placed and daniel sleeve was visualized in cervical os. 3 single sutures were removed from daniel sleeve/cervix. Daniel sleeve was gently removed. No complications. Site was visualized without bleeding from suture sites. Blood from cervical os, appropriate for postoperative period.       Assessment:    Cheyanne Ramos is a 32 year old woman with a history of AIS, KIKE III diagnosed on [facility] 9/2014 with negative endo and ectocervical margins, s/p recent cervical dilation, biopsy, and daniel sleeve placement, now s/p daniel sleeve removal. Recent pap and ECC negative for dysplasia or malignancy.    Plan:     1.)   History  of AIS/KIKE III: AIS and KIKE III diagnosed on [facility] in 9/2014 with negative margins, has been followed appropriately with q 6 months colposcopy and pap smears, which have been normal. There is concern the endocervix has not been adequately sampled. Patient has also been trying to become pregnant for the past ~ 9 months. Cervical stenosis was appreciated on previous cervical exams, unclear if this was contributing to her inability to become pregnant. Discussed referral to [facility] which patient will consider. Patient is now s/p cervical dilation, endocervical biopsy, and daniel sleeve placement. Daniel sleeve was removed at today's visit without complications.I have previously discussed recommendation for eventual hysterectomy in patients with history of AIS, however given her desire for pregnancy this will be postponed with continued surveillance.  Patient can continue follow up per gynecologist for continued surveillance of AIS/CINIII.     2.) Genetic risk factors were assessed and the patient does not meet the qualifications for a referral.      3.) Labs and/or tests ordered include:  None.     4.) Health maintenance issues addressed today include need for continued surveillance.      Thank you for allowing us to participate in the care of your patient.         Sincerely,    Key Crow MD    Department of Ob/Gyn and Women's Health  Division of Gynecologic Oncology  [facility]  [phone]    Of a 25  minute appointment, more than 50% was spent in counseling the patient.      CC  Patient Care Team:  Primary Rai Turner MD as PCP - General  Amalia Salgado RN as Registered Nurse  FRANCISCA RATLIFF

## 2017-06-22 NOTE — MR AVS SNAPSHOT
After Visit Summary   6/22/2017    Cheyanne Ramos    MRN: [id number]           Patient Information     Date Of Birth          1985        Visit Information        Provider Department      6/22/2017 3:20 PM Key Crow MD [facility]        Today's Diagnoses     Postop check    -  1    Adenocarcinoma in situ (AIS) of uterine cervix           Follow-ups after your visit        Who to contact     If you have questions or need follow up information about today's clinic visit or your schedule please contact [facility] CANCER [facility] directly at [phone].  Normal or non-critical lab and imaging results will be communicated to you by [employer]hart, letter or phone within 4 business days after the clinic has received the results. If you do not hear from us within 7 days, please contact the clinic through [employer] or phone. If you have a critical or abnormal lab result, we will notify you by phone as soon as possible.  Submit refill requests through [employer] or call your pharmacy and they will forward the refill request to us. Please allow 3 business days for your refill to be completed.          Additional Information About Your Visit        MyChart Information     [employer] gives you secure access to your electronic health record. If you see a primary care provider, you can also send messages to your care team and make appointments. If you have questions, please call your primary care clinic.  If you do not have a primary care provider, please call [phone] and they will assist you.        Care EveryWhere ID     This is your Care EveryWhere ID. This could be used by other organizations to access your [facility] medical records  [id number]        Your Vitals Were     Pulse Temperature Respirations Pulse Oximetry BMI (Body Mass Index)       77 98.3  F (36.8  C) (Oral) 14 98% 20.74 kg/m2        Blood Pressure from Last 3 Encounters:   06/22/17 123/78   06/15/17 111/85    05/18/17 135/83    Weight from Last 3 Encounters:   06/22/17 67.4 kg (148 lb 11.2 oz)   06/15/17 63.5 kg (140 lb)   05/18/17 66.9 kg (147 lb 6.4 oz)              Today, you had the following     No orders found for display       Primary Care Provider    Unknown Primary MD Johnny       No address on file        Equal Access to Services     [facility]: Hadii aad ku hadasho Soomaali, waaxda luqadaha, qaybta kaalmada adeegyada, waxay idiin hayaan adebelia jasso lasebastienn . So [facility] [phone].    ATENCIÓN: Si habla español, tiene a duke disposición servicios gratuitos de asistencia lingüística. Llame al [phone].    We comply with applicable federal civil rights laws and [place] laws. We do not discriminate on the basis of race, color, national origin, age, disability sex, sexual orientation or gender identity.            Thank you!     Thank you for choosing [facility] CANCER CLINIC  for your care. Our goal is always to provide you with excellent care. Hearing back from our patients is one way we can continue to improve our services. Please take a few minutes to complete the written survey that you may receive in the mail after your visit with us. Thank you!             Your Updated Medication List - Protect others around you: Learn how to safely use, store and throw away your medicines at www.disposemymeds.org.          This list is accurate as of: 6/22/17 11:59 PM.  Always use your most recent med list.                   Brand Name Dispense Instructions for use Diagnosis    cetirizine 10 MG tablet    zyrTEC          conjugated estrogens cream    PREMARIN    30 g    Place 0.5 g vaginally daily    Adenocarcinoma in situ (AIS) of uterine cervix, Stenosis of cervix uteri       ibuprofen 600 MG tablet    ADVIL/MOTRIN    30 tablet    Take 1 tablet (600 mg) by mouth every 6 hours as needed for pain (mild)    Adenocarcinoma in situ (AIS) of uterine cervix, Stenosis of cervix uteri

## 2017-06-24 PROBLEM — D06.9 ADENOCARCINOMA IN SITU (AIS) OF UTERINE CERVIX: Status: ACTIVE | Noted: 2017-06-24

## 2017-06-24 PROBLEM — Z09 POSTOP CHECK: Status: ACTIVE | Noted: 2017-06-24

## 2017-12-22 ENCOUNTER — TRANSFERRED RECORDS (OUTPATIENT)
Dept: HEALTH INFORMATION MANAGEMENT | Facility: CLINIC | Age: 32
End: 2017-12-22

## 2018-06-28 ENCOUNTER — TRANSFERRED RECORDS (OUTPATIENT)
Dept: HEALTH INFORMATION MANAGEMENT | Facility: CLINIC | Age: 33
End: 2018-06-28

## 2018-10-12 ENCOUNTER — TRANSFERRED RECORDS (OUTPATIENT)
Dept: HEALTH INFORMATION MANAGEMENT | Facility: CLINIC | Age: 33
End: 2018-10-12

## 2019-04-01 ENCOUNTER — TRANSFERRED RECORDS (OUTPATIENT)
Dept: HEALTH INFORMATION MANAGEMENT | Facility: CLINIC | Age: 34
End: 2019-04-01

## 2019-04-15 ENCOUNTER — TRANSFERRED RECORDS (OUTPATIENT)
Dept: HEALTH INFORMATION MANAGEMENT | Facility: CLINIC | Age: 34
End: 2019-04-15

## 2019-10-09 ENCOUNTER — TRANSFERRED RECORDS (OUTPATIENT)
Dept: HEALTH INFORMATION MANAGEMENT | Facility: CLINIC | Age: 34
End: 2019-10-09

## 2019-11-05 ENCOUNTER — TRANSFERRED RECORDS (OUTPATIENT)
Dept: HEALTH INFORMATION MANAGEMENT | Facility: CLINIC | Age: 34
End: 2019-11-05

## 2019-11-06 ENCOUNTER — TELEPHONE (OUTPATIENT)
Dept: ONCOLOGY | Facility: CLINIC | Age: 34
End: 2019-11-06

## 2019-11-06 NOTE — TELEPHONE ENCOUNTER
Was informed that Cheyanne called to see Dr. Crow- no information on symptoms given. Called Cheyanne to inquire on symptoms, no answer, given # for call back to further triage.  DEJAH Mendenhall, FNP-C  Gynecologic Oncology  [facility]  Pager: [phone]

## 2020-03-10 ENCOUNTER — HEALTH MAINTENANCE LETTER (OUTPATIENT)
Age: 35
End: 2020-03-10

## 2020-12-27 ENCOUNTER — HEALTH MAINTENANCE LETTER (OUTPATIENT)
Age: 35
End: 2020-12-27

## 2021-04-24 ENCOUNTER — HEALTH MAINTENANCE LETTER (OUTPATIENT)
Age: 36
End: 2021-04-24

## 2021-10-09 ENCOUNTER — HEALTH MAINTENANCE LETTER (OUTPATIENT)
Age: 36
End: 2021-10-09

## 2022-02-11 ENCOUNTER — TRANSCRIBE ORDERS (OUTPATIENT)
Dept: OTHER | Age: 37
End: 2022-02-11
Payer: COMMERCIAL

## 2022-02-11 ENCOUNTER — DOCUMENTATION ONLY (OUTPATIENT)
Dept: ONCOLOGY | Facility: CLINIC | Age: 37
End: 2022-02-11
Payer: COMMERCIAL

## 2022-02-11 DIAGNOSIS — D06.9 ADENOCARCINOMA IN SITU (AIS) OF UTERINE CERVIX: Primary | ICD-10-CM

## 2022-02-11 NOTE — PROGRESS NOTES
Action February 11, 2022 12:07 PM ABT   Action Taken Called and spoke to patient, patient gave verbal auth to pull records in [place]. Patient states she has not had any recent images done (over 5 years)    Image request sent to [facility] for 07/20/18: XR Hysterosalpingogram     Action February 23, 2022 12:05 PM ABT   Action Taken Images from [facility] received and resolved to PACS

## 2022-03-12 NOTE — PROGRESS NOTES
RECORDS STATUS - ALL OTHER DIAGNOSIS      RECORDS RECEIVED FROM: [facility]/ [facility]   DATE RECEIVED: 4/4/2022   NOTES STATUS DETAILS   OFFICE NOTE from referring provider Complete   Referral from Racheal Ribera,    OFFICE NOTE from medical oncologist Complete  [facility] Records are in [facility]    6/22/2017 Postop Check -     More in [facility]   OPERATIVE REPORT Complete See Path Reports and consults in [facility]   MEDICATION LIST Complete [facility]   CLINICAL TRIAL TREATMENTS TO DATE     LABS     PATHOLOGY REPORTS  6/15/2017   ENDOCERVIX, CURETTAGE:   - Fragments of benign, non-dysplastic squamous epithelium and   endocervical tissue   - Rare, scant, superficial fragments of benign endometrium, without   hyperplasia or atypia    5/12/2017 Path Consult   [facility] [place] ([id number], OBTAINED   08/11/2014):   Pap test:   - High grade intraepithelial lesion (HSIL)     More path consults in [facility]   ANYTHING RELATED TO DIAGNOSIS     GENONOMIC TESTING     TYPE:     IMAGING (NEED IMAGES & REPORT)     CT SCANS     MRI     MAMMO     ULTRASOUND     PET       Action    Action Taken 3/12/2022 4:25pm LENNY     I called pt Cheyanne - patty.

## 2022-04-03 NOTE — PROGRESS NOTES
Consult Notes on Referred Patient    Date: 2022     Dr. Racheal Ribera  [facility]  [address] [address]     RE: Chyeanne Ramos  : 1985  LATHA: 2022    Dear Dr. Racheal Ribera:    I had the pleasure of seeing your patient Cheyanne Ramos here at the Gynecologic Cancer Clinic at the [facility] on 2022.  As you know she is a very pleasant 37 year old woman with a diagnosis of cervical adenocarcinoma in situ.     HPI:  Cheyanne Ramos is a  female with history of cervical adenocarcinoma in situ -- CKC in  with negative margins    Hasn't had any abnormal screening/surveillance since we last saw her. She has irregular periods q 14-40 days since chemotherapy treatment. Not having hot flashes or night sweats. She sees Dr. Nevarez ([facility]) q 6 months for follow up of triple neg breast cancer (dx 2018 and underwent chemotherapy, surgery and radiation). Last saw her in February. She's physically active: biking, walking, strength training for exercise. She is not using anything for contraception. Otherwise, denies fever/chills, nausea/emesis, abdominal pain/bloating, abnormal vaginal discharge, shortness of breath, chest pain, cough, masses/lumps, lower extremity edema.     GYN History:     AIS - Diagnosed and underwent CKC in  with negative margins     6/15/2017 [facility] - Exam under anesthesia, dilatation of cervical canal, endocervical curettings and Pap smear, placement of Connor sleeve  - for cervical stenosis, improve surveillance w/ ECC, at that time desired future fertility     3/10/22: ECC Pap + HPV: BART hrHPV negative, ECC benign scant fragments      -  section x 1, twins   Menses: irregular cycles 14 days to 40 days since chemotherapy   Last pap smear: Date: 3/10/2022  History of abnormal pap smear: Yes, AIS in   Sexually active, no dyspareunia     Review of Systems:    I have reviewed the  pertinent positive findings in the review of symptoms with the patient.    Past Medical History:    Past Medical History:   Diagnosis Date     PONV (postoperative nausea and vomiting)      Triple negative malignant neoplasm of breast (H) 2018    Genetic testing negative     Past Surgical History:    Past Surgical History:   Procedure Laterality Date     BREAST BIOPSY, RT/LT        SECTION       COLPOSCOPY CERVIX, BIOPSY CERVIX, ENDOCERVICAL CURETTAGE, COMBINED       CONIZATION      AIS, negative margins     DILATION AND CURETTAGE, WITH ULTRASOUND GUIDANCE N/A 06/15/2017    Procedure: DILATION AND CURETTAGE, WITH ULTRASOUND GUIDANCE;  Dilation of Cervical Canal Under Ultrasound Guidance, Endocervical Curettage, Placement of Connor Sleeve and Pap smear;  Surgeon: Key Crow MD;  Location: [facility] OR     MASTECTOMY, BILATERAL  2019    w/ reconstruction 2020       Health Maintenance:  Health Maintenance Due   Topic Date Due     PREVENTIVE CARE VISIT  Never done     ADVANCE CARE PLANNING  Never done     HIV SCREENING  Never done     HEPATITIS C SCREENING  Never done     COVID-19 Vaccine (3 - Booster for Moderna series) 2021     INFLUENZA VACCINE (1) 2021     PHQ-2 (once per calendar year)  Never done     Current Medications:     has a current medication list which includes the following prescription(s): cetirizine, conjugated estrogens, and ibuprofen.     Allergies:     Propoxyphene n-apap and Metronidazole      Social History:     Social History     Tobacco Use     Smoking status: Never Smoker     Smokeless tobacco: Never Used   Substance Use Topics     Alcohol use: Yes     Comment: occasional       History   Drug Use No     Family History:     The patient's family history is notable for:     Family History   Problem Relation Age of Onset     Esophageal Cancer Maternal Grandmother      Breast Cancer No family hx of      Ovarian Cancer No family hx of      Uterine Cancer No family hx of   "    Colon Cancer No family hx of      Physical Exam:     /84   Pulse 75   Temp 98.2  F (36.8  C) (Oral)   Ht 1.75 m (5' 8.9\")   Wt 69.4 kg (153 lb)   LMP 2022   SpO2 100%   BMI 22.66 kg/m    Body mass index is 22.66 kg/m .    General Appearance: healthy and alert, no distress       Cardiovascular: regular rate and rhythm, no gallops, rubs or murmurs     Respiratory: lungs clear, no rales, rhonchi or wheezes, normal diaphragmatic excursion    Musculoskeletal: extremities non tender and without edema    Skin: no lesions or rashes     Neurological: normal gait, no gross defects     Psychiatric: appropriate mood and affect                               Hematological: normal cervical, supraclavicular and inguinal lymph nodes     Gastrointestinal:       abdomen soft, non-tender, non-distended, no organomegaly or masses    Genitourinary: External genitalia and urethral meatus appears normal.  Vagina is smooth without nodularity or masses.  Cervix with scarring, consistent with previous CKC.  Bimanual exam reveal no masses, nodularity or fullness.  Uterus small and mobile on bimanual exam.     Assessment:    Cheyanne Ramos is a  female with history of cervical adenocarcinoma in situ s/p CKC in  with negative margins, has been undergoing surveillance since that time with colposcopy, ECC and pap smears. Desires definitive management as she's completed childbearing and has underwent treatment for triple negative breast cancer in 2018.     A total of 45 minutes was spent with the patient, 30 minutes of which were spent in counseling the patient and/or treatment planning.    Plan:     1.)        History of cervical adenocarcinoma in situ s/p CKC in  with negative margins: Has completed childbearing. Desires definitive management and we recommended robot assisted total laparoscopic hysterectomy, bilateral salpingectomy. Given her age, hormone receptor negative breast cancer and the fact that she's " "still having menses, would recommend ovaries be left in situ for cardiac and bone health. Discussed surgical risks not limited to bleeding, infection, damage to surrounding organs, need for blood transfusions and ICU stay. We also reviewed postoperative restrictions/expectations.     2.) Genetic risk factors were assessed: Per patient report and documentation, genetic testing was performed following her breast cancer diagnosis and was \"negative\". We'll obtain copy of the genetic testing report as this would affect recommendation for oophorectomy.     3.) Labs and/or tests ordered include: CBC, BMP, Type and Screen      4.) Health maintenance issues addressed today include: None     5.) Pre-op teaching was completed today.  Risks of surgery were discussed to include: bleeding, transfusion, infection, unintentional injury to surrounding organs/structures.    Thank you for allowing us to participate in the care of your patient.       Sincerely,    Key Crow MD    Department of Ob/Gyn and Women's Health  Division of Gynecologic Oncology  [facility]  [phone]    Of a 35 minute appointment, more than 50% was spent in counseling the patient.        CC  Patient Care Team:  Racheal Ratliff as PCP - General (OB/Gyn)  Amalia Salgado RN as Registered Nurse  Key Crow MD as MD (Gynecologic Oncology)  Racheal Ratliff as Referring Physician (OB/Gyn)  RACHEAL RATLIFF    "

## 2022-04-04 ENCOUNTER — ONCOLOGY VISIT (OUTPATIENT)
Dept: ONCOLOGY | Facility: CLINIC | Age: 37
End: 2022-04-04
Attending: OBSTETRICS & GYNECOLOGY
Payer: COMMERCIAL

## 2022-04-04 ENCOUNTER — PRE VISIT (OUTPATIENT)
Dept: ONCOLOGY | Facility: CLINIC | Age: 37
End: 2022-04-04

## 2022-04-04 VITALS
HEART RATE: 75 BPM | SYSTOLIC BLOOD PRESSURE: 130 MMHG | OXYGEN SATURATION: 100 % | DIASTOLIC BLOOD PRESSURE: 84 MMHG | TEMPERATURE: 98.2 F | WEIGHT: 153 LBS | HEIGHT: 69 IN | BODY MASS INDEX: 22.66 KG/M2

## 2022-04-04 DIAGNOSIS — D06.9 ADENOCARCINOMA IN SITU (AIS) OF UTERINE CERVIX: ICD-10-CM

## 2022-04-04 PROCEDURE — G0463 HOSPITAL OUTPT CLINIC VISIT: HCPCS

## 2022-04-04 PROCEDURE — 99204 OFFICE O/P NEW MOD 45 MIN: CPT | Performed by: OBSTETRICS & GYNECOLOGY

## 2022-04-04 RX ORDER — PHENAZOPYRIDINE HYDROCHLORIDE 200 MG/1
200 TABLET, FILM COATED ORAL ONCE
Status: CANCELLED | OUTPATIENT
Start: 2022-04-04 | End: 2022-04-04

## 2022-04-04 RX ORDER — CEFAZOLIN SODIUM 2 G/50ML
2 SOLUTION INTRAVENOUS
Status: CANCELLED | OUTPATIENT
Start: 2022-04-04

## 2022-04-04 RX ORDER — HEPARIN SODIUM 5000 [USP'U]/.5ML
5000 INJECTION, SOLUTION INTRAVENOUS; SUBCUTANEOUS
Status: CANCELLED | OUTPATIENT
Start: 2022-04-04

## 2022-04-04 ASSESSMENT — PAIN SCALES - GENERAL: PAINLEVEL: NO PAIN (0)

## 2022-04-04 NOTE — NURSING NOTE
"Oncology Rooming Note    April 4, 2022 1:01 PM   Cheyanne Ramos is a 36 year old female who presents for:    Chief Complaint   Patient presents with     Oncology Clinic Visit     new eval for AIS of uterine cervix     Initial Vitals: /84   Pulse 75   Temp 98.2  F (36.8  C) (Oral)   Ht 1.75 m (5' 8.9\")   Wt 69.4 kg (153 lb)   LMP 03/31/2022   SpO2 100%   BMI 22.66 kg/m   Estimated body mass index is 22.66 kg/m  as calculated from the following:    Height as of this encounter: 1.75 m (5' 8.9\").    Weight as of this encounter: 69.4 kg (153 lb). Body surface area is 1.84 meters squared.  No Pain (0) Comment: Data Unavailable   Patient's last menstrual period was 03/31/2022.  Allergies reviewed: Yes  Medications reviewed: Yes    Medications: Medication refills not needed today.  Pharmacy name entered into EPIC: Data Unavailable    Clinical concerns: none       Esperanza Fall, EMT            "

## 2022-04-06 ENCOUNTER — HOSPITAL ENCOUNTER (INPATIENT)
Facility: CLINIC | Age: 37
Setting detail: SURGERY ADMIT
End: 2022-04-06
Attending: OBSTETRICS & GYNECOLOGY | Admitting: OBSTETRICS & GYNECOLOGY
Payer: COMMERCIAL

## 2022-04-07 ENCOUNTER — DOCUMENTATION ONLY (OUTPATIENT)
Dept: ONCOLOGY | Facility: CLINIC | Age: 37
End: 2022-04-07
Payer: COMMERCIAL

## 2022-04-07 NOTE — PATIENT INSTRUCTIONS
Surgery to be scheduled  Post operative visit will be set up about 2 weeks after your surgery, you will be contacted with at date and time

## 2022-04-07 NOTE — PROGRESS NOTES
RN Care Coordinator: Jaci Salgado; [phone]    Surgery is scheduled with Dr. Crow on 6/8 at the [facility].  Scheduled per availability.    H&P to be completed by Primary Care Provider     COVID-19 test: will be at a clinic near the patient, if they are able to schedule within 4 days and get results     Post-op: will be scheduled by the clinic    Patient will receive a phone call from pre-admission nurses 1-2 days prior to surgery with arrival and start time.    Patient will contact clinic near home to schedule COVID-19 test 2-4 days prior to surgery.    RNCC to contact patient, and so I do not need to contact the patient at this time. RNCC will contact me if I need to reach out to the patient. No further action needed at this time.     The surgery packet was provided by the RN during appointment

## 2022-04-07 NOTE — NURSING NOTE
Pre Op Nurse Teaching Template    Relevant Diagnosis: ais    Teaching Topic: HYSTERECTOMY, TOTAL, ROBOT-ASSISTED, LAPAROSCOPIC, WITH SALPINGectomy, possible open abdomen, cystoscopy    Person(s) involved in teaching :  Patient   Motivation Level:  Asks Questions:    Yes      Eager to Learn:     Yes     Cooperative:          Yes    Receptive (willing. Able to accept information):    Yes      Patient and those who are listed above demonstrates understanding of the following:   Reason for the appointment, diagnosis and treatment plan:   Yes   Knowledge of proper use of medications and conditions for which they are ordered (with special attention to potential side effects or drug interactions): Yes   Which situations necessitate calling provider and whom to contact: Yes         Nutritional needs and diet plan:  Yes      Pain management techniques:     Yes, Pain Scale   Diet:   Yes, [facility] Diet Instructions    Teaching Concerns addressed: Yes    Infection Prevention:  Patient and those who are listed above demonstrate understanding of the following:  Pre-Op CHG Bathing Instructions: Yes  Surgical procedure site care taught:   Yes   Signs and symptoms of infection taught: Yes       Instructional Materials Used/Given:  The [facility] Before You Surgery Booklet  Showering or Bathing before Surgery Instructions & CHG Product  Hysterectomy Guidelines  Pain Assessment Tool   Home Care after Major Abdominal or Vaginal Surgery  Map  Accommodations Brochure  Phone numbers for [facility] and [facility]  Copy of Surgical Consent    Done Today:   Preop Visit not needed   ests Ordered:  Post Op Visit Scheduled:tbd  Comments:    Surgery date/time:tbd      Covid test to be done 3-4 days before surgery date    Consent signed via IPAD and on file in media  .     several attempts to access IV line and blood ,unsuccesful, made aware to CT Scan of chest

## 2022-04-20 NOTE — PROGRESS NOTES
RN Care Coordinator: Jaci Salgado; [phone]    Surgery is scheduled with Dr. Crow on 7/13 at the [facility].  Rescheduled per patient.    H&P to be completed by Primary Care Provider     COVID-19 test: will be at a clinic near the patient, if they are able to schedule within 4 days and get results     Post-op: will be scheduled by the clinic    Patient will receive a phone call from pre-admission nurses 1-2 days prior to surgery with arrival and start time.    Patient will contact clinic near home to schedule COVID-19 test 2-4 days prior to surgery.    RNCC to contact patient, and so I do not need to contact the patient at this time. RNCC will contact me if I need to reach out to the patient. No further action needed at this time.     The surgery packet was provided by the RN during appointment

## 2022-04-25 DIAGNOSIS — Z11.59 ENCOUNTER FOR SCREENING FOR OTHER VIRAL DISEASES: Primary | ICD-10-CM

## 2022-05-21 ENCOUNTER — HEALTH MAINTENANCE LETTER (OUTPATIENT)
Age: 37
End: 2022-05-21

## 2022-06-08 ENCOUNTER — PATIENT OUTREACH (OUTPATIENT)
Dept: ONCOLOGY | Facility: CLINIC | Age: 37
End: 2022-06-08

## 2022-06-10 NOTE — PROGRESS NOTES
Spoke with pt   Given email, fax, my chart as options to get FMLA papers to clinic   Fax # [phone]  Will try to send tomorrow

## 2022-06-10 NOTE — PROGRESS NOTES
Pt left message on voice mail   Has surgery 7/13   Needs fmla papers filled out  Wants to know how to get them to clinic

## 2022-06-28 ENCOUNTER — TRANSFERRED RECORDS (OUTPATIENT)
Dept: MULTI SPECIALTY CLINIC | Facility: CLINIC | Age: 37
End: 2022-06-28

## 2022-06-28 LAB
ALT SERPL-CCNC: 9 U/L (ref 5–30)
AST SERPL-CCNC: 10 U/L (ref 7–31)
CREATININE (EXTERNAL): 0.79 MG/DL (ref 0.6–1.1)
GFR ESTIMATED (EXTERNAL): >60 ML/MIN
GFR ESTIMATED (IF AFRICAN AMERICAN) (EXTERNAL): >60 ML/MIN
GLUCOSE (EXTERNAL): 93 MG/DL (ref 70–105)
POTASSIUM (EXTERNAL): 4.66 MMOL/L (ref 3.5–5.1)

## 2022-07-13 ENCOUNTER — TRANSFERRED RECORDS (OUTPATIENT)
Dept: HEALTH INFORMATION MANAGEMENT | Facility: CLINIC | Age: 37
End: 2022-07-13

## 2022-07-14 ENCOUNTER — ANESTHESIA EVENT (OUTPATIENT)
Dept: SURGERY | Facility: AMBULATORY SURGERY CENTER | Age: 37
End: 2022-07-14
Payer: COMMERCIAL

## 2022-07-14 RX ORDER — NALOXONE HYDROCHLORIDE 0.4 MG/ML
0.2 INJECTION, SOLUTION INTRAMUSCULAR; INTRAVENOUS; SUBCUTANEOUS
Status: DISCONTINUED | OUTPATIENT
Start: 2022-07-14 | End: 2022-07-16 | Stop reason: HOSPADM

## 2022-07-14 RX ORDER — NALOXONE HYDROCHLORIDE 0.4 MG/ML
0.4 INJECTION, SOLUTION INTRAMUSCULAR; INTRAVENOUS; SUBCUTANEOUS
Status: DISCONTINUED | OUTPATIENT
Start: 2022-07-14 | End: 2022-07-16 | Stop reason: HOSPADM

## 2022-07-15 ENCOUNTER — HOSPITAL ENCOUNTER (OUTPATIENT)
Facility: AMBULATORY SURGERY CENTER | Age: 37
Discharge: HOME | End: 2022-07-15
Attending: OBSTETRICS & GYNECOLOGY
Payer: COMMERCIAL

## 2022-07-15 ENCOUNTER — ANESTHESIA (OUTPATIENT)
Dept: SURGERY | Facility: AMBULATORY SURGERY CENTER | Age: 37
End: 2022-07-15
Payer: COMMERCIAL

## 2022-07-15 VITALS
HEART RATE: 70 BPM | RESPIRATION RATE: 18 BRPM | TEMPERATURE: 97.5 F | HEIGHT: 69 IN | OXYGEN SATURATION: 98 % | DIASTOLIC BLOOD PRESSURE: 64 MMHG | SYSTOLIC BLOOD PRESSURE: 120 MMHG | BODY MASS INDEX: 22.22 KG/M2 | WEIGHT: 150 LBS

## 2022-07-15 DIAGNOSIS — D06.9 ADENOCARCINOMA IN SITU (AIS) OF UTERINE CERVIX: ICD-10-CM

## 2022-07-15 DIAGNOSIS — G89.18 POST-OP PAIN: Primary | ICD-10-CM

## 2022-07-15 LAB
HCG UR QL: NEGATIVE
INTERNAL QC OK POCT: NORMAL
POCT KIT EXPIRATION DATE: NORMAL
POCT KIT LOT NUMBER: NORMAL

## 2022-07-15 PROCEDURE — 88307 TISSUE EXAM BY PATHOLOGIST: CPT | Mod: 26 | Performed by: PATHOLOGY

## 2022-07-15 PROCEDURE — 88307 TISSUE EXAM BY PATHOLOGIST: CPT | Mod: TC | Performed by: OBSTETRICS & GYNECOLOGY

## 2022-07-15 PROCEDURE — 81025 URINE PREGNANCY TEST: CPT | Performed by: PATHOLOGY

## 2022-07-15 PROCEDURE — 58571 TLH W/T/O 250 G OR LESS: CPT | Mod: GC

## 2022-07-15 RX ORDER — ONDANSETRON 2 MG/ML
INJECTION INTRAMUSCULAR; INTRAVENOUS PRN
Status: DISCONTINUED | OUTPATIENT
Start: 2022-07-15 | End: 2022-07-15

## 2022-07-15 RX ORDER — DEXAMETHASONE SODIUM PHOSPHATE 4 MG/ML
INJECTION, SOLUTION INTRA-ARTICULAR; INTRALESIONAL; INTRAMUSCULAR; INTRAVENOUS; SOFT TISSUE PRN
Status: DISCONTINUED | OUTPATIENT
Start: 2022-07-15 | End: 2022-07-15

## 2022-07-15 RX ORDER — SODIUM CHLORIDE, SODIUM LACTATE, POTASSIUM CHLORIDE, CALCIUM CHLORIDE 600; 310; 30; 20 MG/100ML; MG/100ML; MG/100ML; MG/100ML
INJECTION, SOLUTION INTRAVENOUS CONTINUOUS
Status: DISCONTINUED | OUTPATIENT
Start: 2022-07-15 | End: 2022-07-16 | Stop reason: HOSPADM

## 2022-07-15 RX ORDER — OXYCODONE HYDROCHLORIDE 5 MG/1
5 TABLET ORAL EVERY 4 HOURS PRN
Status: DISCONTINUED | OUTPATIENT
Start: 2022-07-15 | End: 2022-07-16 | Stop reason: HOSPADM

## 2022-07-15 RX ORDER — HEPARIN SODIUM 10000 [USP'U]/ML
5000 INJECTION, SOLUTION INTRAVENOUS; SUBCUTANEOUS
Status: COMPLETED | OUTPATIENT
Start: 2022-07-15 | End: 2022-07-15

## 2022-07-15 RX ORDER — ONDANSETRON 4 MG/1
4 TABLET, ORALLY DISINTEGRATING ORAL EVERY 30 MIN PRN
Status: DISCONTINUED | OUTPATIENT
Start: 2022-07-15 | End: 2022-07-16 | Stop reason: HOSPADM

## 2022-07-15 RX ORDER — FENTANYL CITRATE 50 UG/ML
INJECTION, SOLUTION INTRAMUSCULAR; INTRAVENOUS PRN
Status: DISCONTINUED | OUTPATIENT
Start: 2022-07-15 | End: 2022-07-15

## 2022-07-15 RX ORDER — FENTANYL CITRATE 50 UG/ML
25 INJECTION, SOLUTION INTRAMUSCULAR; INTRAVENOUS EVERY 5 MIN PRN
Status: DISCONTINUED | OUTPATIENT
Start: 2022-07-15 | End: 2022-07-15 | Stop reason: HOSPADM

## 2022-07-15 RX ORDER — GLYCOPYRROLATE 0.2 MG/ML
INJECTION, SOLUTION INTRAMUSCULAR; INTRAVENOUS PRN
Status: DISCONTINUED | OUTPATIENT
Start: 2022-07-15 | End: 2022-07-15

## 2022-07-15 RX ORDER — PROPOFOL 10 MG/ML
INJECTION, EMULSION INTRAVENOUS PRN
Status: DISCONTINUED | OUTPATIENT
Start: 2022-07-15 | End: 2022-07-15

## 2022-07-15 RX ORDER — SODIUM CHLORIDE, SODIUM LACTATE, POTASSIUM CHLORIDE, CALCIUM CHLORIDE 600; 310; 30; 20 MG/100ML; MG/100ML; MG/100ML; MG/100ML
INJECTION, SOLUTION INTRAVENOUS CONTINUOUS
Status: DISCONTINUED | OUTPATIENT
Start: 2022-07-15 | End: 2022-07-15 | Stop reason: HOSPADM

## 2022-07-15 RX ORDER — PHENAZOPYRIDINE HYDROCHLORIDE 100 MG/1
200 TABLET, FILM COATED ORAL ONCE
Status: COMPLETED | OUTPATIENT
Start: 2022-07-15 | End: 2022-07-15

## 2022-07-15 RX ORDER — FENTANYL CITRATE 50 UG/ML
25-50 INJECTION, SOLUTION INTRAMUSCULAR; INTRAVENOUS
Status: DISCONTINUED | OUTPATIENT
Start: 2022-07-15 | End: 2022-07-15 | Stop reason: HOSPADM

## 2022-07-15 RX ORDER — OXYCODONE HYDROCHLORIDE 5 MG/1
5 TABLET ORAL EVERY 6 HOURS PRN
Qty: 6 TABLET | Refills: 0 | Status: SHIPPED | OUTPATIENT
Start: 2022-07-15 | End: 2022-07-18

## 2022-07-15 RX ORDER — LIDOCAINE HYDROCHLORIDE 20 MG/ML
INJECTION, SOLUTION INFILTRATION; PERINEURAL PRN
Status: DISCONTINUED | OUTPATIENT
Start: 2022-07-15 | End: 2022-07-15

## 2022-07-15 RX ORDER — MEPERIDINE HYDROCHLORIDE 25 MG/ML
12.5 INJECTION INTRAMUSCULAR; INTRAVENOUS; SUBCUTANEOUS
Status: DISCONTINUED | OUTPATIENT
Start: 2022-07-15 | End: 2022-07-16 | Stop reason: HOSPADM

## 2022-07-15 RX ORDER — FENTANYL CITRATE 50 UG/ML
25 INJECTION, SOLUTION INTRAMUSCULAR; INTRAVENOUS
Status: DISCONTINUED | OUTPATIENT
Start: 2022-07-15 | End: 2022-07-16 | Stop reason: HOSPADM

## 2022-07-15 RX ORDER — IBUPROFEN 200 MG
800 TABLET ORAL ONCE
Status: DISCONTINUED | OUTPATIENT
Start: 2022-07-15 | End: 2022-07-16 | Stop reason: HOSPADM

## 2022-07-15 RX ORDER — SCOLOPAMINE TRANSDERMAL SYSTEM 1 MG/1
1 PATCH, EXTENDED RELEASE TRANSDERMAL
Status: DISCONTINUED | OUTPATIENT
Start: 2022-07-15 | End: 2022-07-16 | Stop reason: HOSPADM

## 2022-07-15 RX ORDER — LIDOCAINE 40 MG/G
CREAM TOPICAL
Status: DISCONTINUED | OUTPATIENT
Start: 2022-07-15 | End: 2022-07-15 | Stop reason: HOSPADM

## 2022-07-15 RX ORDER — KETOROLAC TROMETHAMINE 30 MG/ML
INJECTION, SOLUTION INTRAMUSCULAR; INTRAVENOUS PRN
Status: DISCONTINUED | OUTPATIENT
Start: 2022-07-15 | End: 2022-07-15

## 2022-07-15 RX ORDER — AMOXICILLIN 250 MG
1-2 CAPSULE ORAL 2 TIMES DAILY
Qty: 30 TABLET | Refills: 0 | Status: SHIPPED | OUTPATIENT
Start: 2022-07-15

## 2022-07-15 RX ORDER — ONDANSETRON 2 MG/ML
4 INJECTION INTRAMUSCULAR; INTRAVENOUS EVERY 30 MIN PRN
Status: DISCONTINUED | OUTPATIENT
Start: 2022-07-15 | End: 2022-07-16 | Stop reason: HOSPADM

## 2022-07-15 RX ORDER — HYDROMORPHONE HYDROCHLORIDE 1 MG/ML
0.2 INJECTION, SOLUTION INTRAMUSCULAR; INTRAVENOUS; SUBCUTANEOUS EVERY 5 MIN PRN
Status: DISCONTINUED | OUTPATIENT
Start: 2022-07-15 | End: 2022-07-15 | Stop reason: HOSPADM

## 2022-07-15 RX ORDER — ALBUTEROL SULFATE 0.83 MG/ML
2.5 SOLUTION RESPIRATORY (INHALATION) EVERY 4 HOURS PRN
Status: DISCONTINUED | OUTPATIENT
Start: 2022-07-15 | End: 2022-07-15 | Stop reason: HOSPADM

## 2022-07-15 RX ORDER — HYDRALAZINE HYDROCHLORIDE 20 MG/ML
2.5-5 INJECTION INTRAMUSCULAR; INTRAVENOUS EVERY 10 MIN PRN
Status: DISCONTINUED | OUTPATIENT
Start: 2022-07-15 | End: 2022-07-15 | Stop reason: HOSPADM

## 2022-07-15 RX ORDER — PROPOFOL 10 MG/ML
INJECTION, EMULSION INTRAVENOUS CONTINUOUS PRN
Status: DISCONTINUED | OUTPATIENT
Start: 2022-07-15 | End: 2022-07-15

## 2022-07-15 RX ORDER — KETOROLAC TROMETHAMINE 30 MG/ML
15 INJECTION, SOLUTION INTRAMUSCULAR; INTRAVENOUS EVERY 6 HOURS PRN
Status: DISCONTINUED | OUTPATIENT
Start: 2022-07-15 | End: 2022-07-16 | Stop reason: HOSPADM

## 2022-07-15 RX ORDER — ACETAMINOPHEN 325 MG/1
975 TABLET ORAL ONCE
Status: DISCONTINUED | OUTPATIENT
Start: 2022-07-15 | End: 2022-07-16 | Stop reason: HOSPADM

## 2022-07-15 RX ORDER — CEFAZOLIN SODIUM 2 G/50ML
2 SOLUTION INTRAVENOUS
Status: COMPLETED | OUTPATIENT
Start: 2022-07-15 | End: 2022-07-15

## 2022-07-15 RX ORDER — LORAZEPAM 2 MG/ML
.5-1 INJECTION INTRAMUSCULAR
Status: DISCONTINUED | OUTPATIENT
Start: 2022-07-15 | End: 2022-07-15 | Stop reason: HOSPADM

## 2022-07-15 RX ORDER — OXYCODONE HYDROCHLORIDE 5 MG/1
5 TABLET ORAL
Status: DISCONTINUED | OUTPATIENT
Start: 2022-07-15 | End: 2022-07-16 | Stop reason: HOSPADM

## 2022-07-15 RX ORDER — FLUMAZENIL 0.1 MG/ML
0.2 INJECTION, SOLUTION INTRAVENOUS
Status: DISCONTINUED | OUTPATIENT
Start: 2022-07-15 | End: 2022-07-15 | Stop reason: HOSPADM

## 2022-07-15 RX ORDER — ACETAMINOPHEN 325 MG/1
975 TABLET ORAL ONCE
Status: COMPLETED | OUTPATIENT
Start: 2022-07-15 | End: 2022-07-15

## 2022-07-15 RX ORDER — SCOLOPAMINE TRANSDERMAL SYSTEM 1 MG/1
1 PATCH, EXTENDED RELEASE TRANSDERMAL ONCE
Status: DISCONTINUED | OUTPATIENT
Start: 2022-07-15 | End: 2022-07-15 | Stop reason: HOSPADM

## 2022-07-15 RX ADMIN — BUPIVACAINE HYDROCHLORIDE 30 ML: 2.5 INJECTION, SOLUTION EPIDURAL; INFILTRATION; INTRACAUDAL at 13:40

## 2022-07-15 RX ADMIN — HEPARIN SODIUM 5000 UNITS: 10000 INJECTION, SOLUTION INTRAVENOUS; SUBCUTANEOUS at 11:44

## 2022-07-15 RX ADMIN — GLYCOPYRROLATE 0.2 MG: 0.2 INJECTION, SOLUTION INTRAMUSCULAR; INTRAVENOUS at 11:03

## 2022-07-15 RX ADMIN — ONDANSETRON 4 MG: 4 TABLET, ORALLY DISINTEGRATING ORAL at 15:12

## 2022-07-15 RX ADMIN — Medication 20 MG: at 12:10

## 2022-07-15 RX ADMIN — PROPOFOL 200 MCG/KG/MIN: 10 INJECTION, EMULSION INTRAVENOUS at 11:18

## 2022-07-15 RX ADMIN — PROPOFOL 200 MG: 10 INJECTION, EMULSION INTRAVENOUS at 11:16

## 2022-07-15 RX ADMIN — Medication 20 MG: at 12:40

## 2022-07-15 RX ADMIN — LIDOCAINE HYDROCHLORIDE 80 MG: 20 INJECTION, SOLUTION INFILTRATION; PERINEURAL at 11:15

## 2022-07-15 RX ADMIN — DEXAMETHASONE SODIUM PHOSPHATE 4 MG: 4 INJECTION, SOLUTION INTRA-ARTICULAR; INTRALESIONAL; INTRAMUSCULAR; INTRAVENOUS; SOFT TISSUE at 11:03

## 2022-07-15 RX ADMIN — PHENAZOPYRIDINE HYDROCHLORIDE 200 MG: 100 TABLET, FILM COATED ORAL at 10:44

## 2022-07-15 RX ADMIN — Medication 0.5 MG: at 11:35

## 2022-07-15 RX ADMIN — ONDANSETRON 4 MG: 2 INJECTION INTRAMUSCULAR; INTRAVENOUS at 11:03

## 2022-07-15 RX ADMIN — Medication 0.5 MG: at 11:32

## 2022-07-15 RX ADMIN — KETOROLAC TROMETHAMINE 30 MG: 30 INJECTION, SOLUTION INTRAMUSCULAR; INTRAVENOUS at 13:11

## 2022-07-15 RX ADMIN — Medication 50 MG: at 11:17

## 2022-07-15 RX ADMIN — SCOLOPAMINE TRANSDERMAL SYSTEM 1 PATCH: 1 PATCH, EXTENDED RELEASE TRANSDERMAL at 11:00

## 2022-07-15 RX ADMIN — SODIUM CHLORIDE, SODIUM LACTATE, POTASSIUM CHLORIDE, CALCIUM CHLORIDE: 600; 310; 30; 20 INJECTION, SOLUTION INTRAVENOUS at 10:49

## 2022-07-15 RX ADMIN — CEFAZOLIN SODIUM 2 G: 2 SOLUTION INTRAVENOUS at 11:03

## 2022-07-15 RX ADMIN — FENTANYL CITRATE 50 MCG: 50 INJECTION, SOLUTION INTRAMUSCULAR; INTRAVENOUS at 11:14

## 2022-07-15 RX ADMIN — FENTANYL CITRATE 50 MCG: 50 INJECTION, SOLUTION INTRAMUSCULAR; INTRAVENOUS at 11:20

## 2022-07-15 RX ADMIN — ACETAMINOPHEN 975 MG: 325 TABLET ORAL at 10:43

## 2022-07-15 NOTE — OP NOTE
Robotic Hysterectomy Procedure Note     Pre-operative Diagnosis:  AIS of the cervix, completion of childbearing and desires definitive therapy    Post-operative Diagnosis: same    Surgeon: Dr. Key Crow     Assistants: Dr. Babar Nelson -- Fellow - assisting            Dr. Terry Mcnulty -- Resident -- assisting     Operative Procedure:  1.  Robotic assisted hysterectomy, bilateral salpingectomy   2. cystoscopy    Anesthesia: General endotracheal anesthesia     Procedure Details :    Indication for Operative Procedure:   The patient is a pleasant 36 yo female with history of AIS who has completed childbearing.  She presents for definitive surgical management.    Description of Operative Findings:   On pelvic examination, the cervix is grossly normal. Uterus is anteverted, small, no obvious adnexal masses. Stomach extremely distended on entry to abdomen, resolved with NG tube placement. On inspection of the abdominal cavity, the undersurface of the diaphragm, spleen, stomach and omentum are all visibly. Liver with some noted sclerotic lesions on liver edge likely from prior chemotherapy.  In the pelvis, the uterus is anteverted, mobile, moderate size.  Left and right ovary without evidence of cysts, normal appearing. Some bowel adhesions noted to the left fallopian tube.Small and large intestine visibly normal.    Operative Procedure:   The patient was identified in the preoperative holding area and consent for surgery was confirmed. She was then taken to the operating room where she underwent the administration of general anesthesia. She was then position in the modified dorsal lithotomy position with yellow-fin stirrups. Bilateral arms were padded and tucked on the side. After position was confirmed, the patient was then prepped and draped in the usual sterile fashion. A transurethral catheter was placed for bladder decompression during surgery. Pelvic examination was then performed Grave's speculum. The  anterior lip of the cervix was grasped with a single-tooth tenaculum, the uterus was sounded to 8cm, the cervix was then serially dilated with Hegar dilators. An medium V-Care uterine manipulator was placed without difficulty.     The abdominal portion of the case wast then approached. A small stab incision was made in the midline, directly through the umbilicus and the Veress needle was used to insufflate the abdomen. 8 mm incisions were then made on the abdomen for placement of robotic trochars.  An 8 mm trochar was then placed supraumbilically. The 8 mm 0 Degree myOrderinci camera was then placed through this trochar and the abdominal cavity viewed in its entirety with findings as noted above. The patient was placed in steep Trendelenburg position and the robotic trochars placed under direct vision. Trochar #1 was placed under direct vision approximately 8 cm lateral to and at the same level as the camera trochar on the right side. Robotic trochar #2 was placed in the mid-clavicular line approximately 2cm superior to the camera trochar on the patient's left side. Robotic trochar #3 was placed approximately 10cm lateral to and at the same level as the camera trochar on the left side. Finally, an 8 mm assistant trochar was placed in the mid-clavicular line approximately 2cm superior to the camera trochar on the patient's right side. Bowel graspers were used to gently sweep the bowel into the upper abdomen. The robot was then docked from the patient's left side without difficulty. Instruments were placed with monopolar scissors in Trochar #1, bipolar graspers in Trochar #2, Prograsp in Trochar #3.     The filmy bowel adhesions noted were taken down. The Prograsp was placed on the round ligament for traction. The bipolar device was used to gently place tension on the epiploica and the monopolar scissors were then used to carefully dissect the filmy adhesions until the bowel was free of the operative site.    The left  uterine cornua was grasped and placed on traction using the Prograsp. The monopolar scissors and bipolar were used to transect the mesosalpinx. The bipolar device was then placed on the round ligament.  The left round ligament was identified, grasped and incised using a combination of bipolar and monopolar cautery allowing entry into the broad ligament. The utero-ovarian ligament was then cauterized with the bipolar device and transected with the monopolar scissors. Attention was then turned to the bladder flap portion of the case. The bladder was gently grasped with the Prograsp. The bipolar grasper was then used to grasp the vesico-peritoneum, which was entered using the monopolar scissors. The vesicouterine peritoneum on the let side was incised and the bladder mobilized off the lower uterine segment, cervix and upper vagina using the V-Care ring as a guide to the position of the cervix. The left uterine vessels were then skeletonized, coagulated and incised using a combination of bipolar and monopolar cautery. Throughout this the ureters remained visual at all times. The same procedure was then performed on the patient's right side.     The uterus was then placed on traction in the cephalad direction using the Prograsp and uterine manipulator. A circumferential vaginotomy incision was made using the monopolar scissors, starting anteriorly and following the V-Care to identify the level of the cervix. Once the vaginotomy had been performed, the specimen including cervix, uterus, fallopian tubes and ovaries was removed through the vagina. The pelvic was irrigated.     The vaginal cuff was closed using two 0 V-lock suture, incorporating bilateral uterosacral ligaments into the vaginal apex with the closure. Hemostasis was confirmed again.  The instruments were removed. The robot was then undocked, the trochars removed. The subcutaneous spaces were irrigated and the trochar skin sites closed with 4-0 vicryl, steri  strips, and prima pore dressing.      A cystoscopy was performed at the end of the procedure. Patient has received pyridium prior. Strong bilateral jets noted. The bladder was drained, cystoscopy removed.     Sponge, instrument and needle count were correct x 2 at completion of the procedure. The patient was woken, extubated and returned to the recovery room in stable condition.              Specimens:   ID Type Source Tests Collected by Time Destination   1 :  Tissue Uterus, Cervix, Bilateral Fallopian Tubes SURGICAL PATHOLOGY EXAM Key Crow MD 7/15/2022 12:38 PM               Implants: None           Complications:  None; patient tolerated the procedure well.           Disposition: PACU - hemodynamically stable.           Condition: stable      Babar Nelson MD  2:55 PM, July 15, 2022  PGY5 GYN ONCOLOGY FELLOW     Key Crow MD    Department of Ob/Gyn and Women's Health  Division of Gynecologic Oncology  [facility]  [phone]    I was scrubbed and present for the entire procedure.      Attending Attestation: I was present and scrubbed for the entire procedure.

## 2022-07-15 NOTE — DISCHARGE INSTRUCTIONS
[facility]  Home Care Following Anesthesia  For 24 hours after surgery:  Get plenty of rest.  A responsible adult must stay with you for at least 24 hours after you leave the surgery center.  Do not drive or use heavy equipment.  If you have weakness or tingling, don't drive or use heavy equipment until this feeling goes away.   Do not drink alcohol.   Avoid strenuous or risky activities.  Ask for help when climbing stairs.  You may feel lightheaded.  IF so, sit for a few minutes before standing.  Have someone help you get up.   If you have nausea (feel sick to your stomach): Drink only clear liquids such as apple juice, ginger ale, broth or 7-Up.  Rest may also help.  Be sure to drink enough fluids.  Move to a regular diet as you feel able.   You may have a slight fever.  Call the doctor if your fever is over 100 F (37.7 C) (taken under the tongue) or lasts longer than 24 hours.  You may have a dry mouth, a sore throat, muscle aches or trouble sleeping. These should go away after 24 hours.  Do not make important or legal decisions.   It is recommended to avoid smoking.               Tips for taking pain medications  To get the best pain relief possible, remember these points:  Take pain medications as directed, before pain becomes severe.  Pain medication can upset your stomach: taking it with food may help.  Constipation is a common side effect of pain medication. Drink plenty of  fluids.  Eat foods high in fiber. Take a stool softener if recommended by your doctor or pharmacist.  Do not drink alcohol, drive or operate machinery while taking pain medications.  Ask about other ways to control pain, such as with heat, ice or relaxation.    Tylenol/Acetaminophen Consumption  To help encourage the safe use of acetaminophen, the makers of TYLENOL  have lowered the maximum daily dose for single-ingredient Extra Strength TYLENOL  (acetaminophen) products sold in the [place] from 8 pills  per day (4,000 mg) to 6 pills per day (3,000 mg). The dosing interval has also changed from 2 pills every 4-6 hours to 2 pills every 6 hours.  If you feel your pain relief is insufficient, you may take Tylenol/Acetaminophen in addition to your narcotic pain medication.   Be careful not to exceed 3,000 mg of Tylenol/Acetaminophen in a 24 hour period from all sources.  If you are taking extra strength Tylenol/acetaminophen (500 mg), the maximum dose is 6 tablets in 24 hours.  If you are taking regular strength acetaminophen (325 mg), the maximum dose is 9 tablets in 24 hours.    Call a doctor for any of the following:  Signs of infection (fever, growing tenderness at the surgery site, a large amount of drainage or bleeding, severe pain, foul-smelling drainage, redness, swelling).  It has been over 8 to 10 hours since surgery and you are still not able to urinate (pass water).  Headache for over 24 hours.  Numbness, tingling or weakness the day after surgery (if you had spinal anesthesia).  Signs of Covid-19 infection (temperature over 100 degrees, shortness of breath, cough, loss of taste/smell, generalized body aches, persistent headache, chills, sore throat, nausea/vomiting/diarrhea)  Your doctor is:  Dr. Key Crow, Gynecologic Oncology: [phone]                    Or dial [phone] and ask for the resident on call for:  Gynecologic Oncology  For emergency care, call the:  [facility]:  [phone] (TTY for hearing impaired: [phone])

## 2022-07-15 NOTE — ANESTHESIA CARE TRANSFER NOTE
Patient: Cheyanne Ramos    Procedure: Procedure(s):  Hysterectomy Total Robot-Assisted Laparoscopic with Salpingectomy, cystoscopy       Diagnosis: Adenocarcinoma in situ (AIS) of uterine cervix [D06.9]  Diagnosis Additional Information: No value filed.    Anesthesia Type:   General     Note:    Oropharynx: oropharynx clear of all foreign objects and spontaneously breathing  Level of Consciousness: drowsy  Oxygen Supplementation: room air    Independent Airway: airway patency satisfactory and stable  Dentition: dentition unchanged  Vital Signs Stable: post-procedure vital signs reviewed and stable  Report to RN Given: handoff report given  Patient transferred to: PACU    Handoff Report: Identifed the Patient, Identified the Reponsible Provider, Reviewed the pertinent medical history, Discussed the surgical course, Reviewed Intra-OP anesthesia mangement and issues during anesthesia, Set expectations for post-procedure period and Allowed opportunity for questions and acknowledgement of understanding      Vitals:  Vitals Value Taken Time   BP     Temp     Pulse     Resp     SpO2         Electronically Signed By: DEJAH Giles CRNA  July 15, 2022  1:34 PM

## 2022-07-15 NOTE — BRIEF OP NOTE
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]  Post-operative diagnosis Same as pre-operative diagnosis    Procedure: Procedure(s):  Hysterectomy Total Robot-Assisted Laparoscopic with Salpingectomy, cystoscopy  Surgeon: Surgeon(s) and Role:     * Key Crow MD - Primary     * Babar Nelson MD - Fellow - Assisting  Anesthesia: General with Block   Estimated Blood Loss: 20 ml  IVF: 900 ml  UOP: 150 cc    Drains: None  Specimens:   ID Type Source Tests Collected by Time Destination   1 :  Tissue Uterus, Cervix, Bilateral Fallopian Tubes SURGICAL PATHOLOGY EXAM Key Crow MD 7/15/2022 12:38 PM      Findings:   Cervix appeared smooth and normal appearing. Atraumatic abdominal entry. Normal appearing bilateral fallopian tubes and ovaries. Filmy adhesions of bowel to the left pelvic sidewall, otherwise no intraabdominal adhesions.   Complications: None.  Implants: * No implants in log *    Terry Mcnulty MD  OB/GYN PGY-3  07/15/2022 1:25 PM

## 2022-07-16 RX ORDER — BUPIVACAINE HYDROCHLORIDE 2.5 MG/ML
INJECTION, SOLUTION EPIDURAL; INFILTRATION; INTRACAUDAL
Status: DISCONTINUED | OUTPATIENT
Start: 2022-07-15 | End: 2022-07-16

## 2022-07-16 NOTE — ANESTHESIA PREPROCEDURE EVALUATION
Anesthesia Pre-Procedure Evaluation    Patient: Cheyanne Ramos   MRN: [id number] : 1985        Procedure : Procedure(s):  Hysterectomy Total Robot-Assisted Laparoscopic with Salpingectomy, cystoscopy          Past Medical History:   Diagnosis Date     PONV (postoperative nausea and vomiting)      Triple negative malignant neoplasm of breast (H) 2018    Genetic testing negative      Past Surgical History:   Procedure Laterality Date     BREAST BIOPSY, RT/LT        SECTION       COLPOSCOPY CERVIX, BIOPSY CERVIX, ENDOCERVICAL CURETTAGE, COMBINED       CONIZATION      AIS, negative margins     DILATION AND CURETTAGE, WITH ULTRASOUND GUIDANCE N/A 06/15/2017    Procedure: DILATION AND CURETTAGE, WITH ULTRASOUND GUIDANCE;  Dilation of Cervical Canal Under Ultrasound Guidance, Endocervical Curettage, Placement of Connor Sleeve and Pap smear;  Surgeon: Key Crow MD;  [facility]     MASTECTOMY, BILATERAL      w/ reconstruction 2020      Allergies   Allergen Reactions     Propoxyphene N-Apap Nausea and Vomiting     Tolerates Oxycodone/Percocet      Metronidazole Rash      Social History     Tobacco Use     Smoking status: Never Smoker     Smokeless tobacco: Never Used   Substance Use Topics     Alcohol use: Yes     Comment: occasional      Wt Readings from Last 1 Encounters:   07/15/22 68 kg (150 lb)        Anesthesia Evaluation   Pt has had prior anesthetic.     History of anesthetic complications  - PONV.      ROS/MED HX  ENT/Pulmonary:  - neg pulmonary ROS     Neurologic:  - neg neurologic ROS     Cardiovascular:  - neg cardiovascular ROS     METS/Exercise Tolerance:     Hematologic:  - neg hematologic  ROS     Musculoskeletal:  - neg musculoskeletal ROS     GI/Hepatic:  - neg GI/hepatic ROS     Renal/Genitourinary:  - neg Renal ROS     Endo:  - neg endo ROS     Psychiatric/Substance Use:  - neg psychiatric ROS     Infectious Disease:  - neg infectious disease ROS     Malignancy:    (+) Malignancy, History of Breast and Other.Breast CA Remission status post.  Other CA Cervical Active status post.    Other:  - neg other ROS          Physical Exam    Airway  airway exam normal           Respiratory Devices and Support         Dental  no notable dental history         Cardiovascular   cardiovascular exam normal          Pulmonary   pulmonary exam normal                OUTSIDE LABS:  CBC: No results found for: WBC, HGB, HCT, PLT  BMP: No results found for: NA, POTASSIUM, CHLORIDE, CO2, BUN, CR, GLC  COAGS: No results found for: PTT, INR, FIBR  POC:   Lab Results   Component Value Date    HCG Negative 07/15/2022     HEPATIC: No results found for: ALBUMIN, PROTTOTAL, ALT, AST, GGT, ALKPHOS, BILITOTAL, BILIDIRECT, KENIA  OTHER: No results found for: PH, LACT, A1C, PURA, PHOS, MAG, LIPASE, AMYLASE, TSH, T4, T3, CRP, SED    Anesthesia Plan    ASA Status:  3   NPO Status:  NPO Appropriate    Anesthesia Type: General.     - Airway: ETT   Induction: Intravenous.   Maintenance: Balanced.        Consents    Anesthesia Plan(s) and associated risks, benefits, and realistic alternatives discussed. Questions answered and patient/representative(s) expressed understanding.    - Discussed:     - Discussed with:  Patient         Postoperative Care    Pain management: IV analgesics, Oral pain medications, Multi-modal analgesia, Peripheral nerve block (Single Shot).   PONV prophylaxis: Ondansetron (or other 5HT-3), Dexamethasone or Solumedrol, Background Propofol Infusion, Scopolamine patch     Comments:                Jhonatan Mcgarry MD

## 2022-07-16 NOTE — ANESTHESIA PROCEDURE NOTES
TAP Procedure Note    Pre-Procedure   Staff -        Anesthesiologist:  Jhonatan Mcgarry MD       Performed By: anesthesiologist       Location: OR       Pre-Anesthestic Checklist: patient identified, IV checked, site marked, risks and benefits discussed, informed consent, monitors and equipment checked, pre-op evaluation, at physician/surgeon's request and post-op pain management  Timeout:       Correct Patient: Yes        Correct Procedure: Yes        Correct Site: Yes        Correct Position: Yes        Correct Laterality: Yes        Site Marked: Yes  Procedure Documentation  Procedure: TAP       Diagnosis: POST OPERATIVE PAIN       Laterality: bilateral       Patient Position: supine       Patient Prep/Sterile Barriers: sterile gloves, mask       Skin prep: Chloraprep       Needle Type: short bevel       Needle Gauge: 21.        Needle Length (millimeters): 110        Ultrasound guided       1. Ultrasound was used to identify targeted nerve, plexus, vascular marker, or fascial plane and place a needle adjacent to it in real-time.       2. Ultrasound was used to visualize the spread of anesthetic in close proximity to the above referenced structure.       3. A permanent image is entered into the patient's record.       4. The visualized anatomic structures appeared normal.       5. There were no apparent abnormal pathologic findings.    Assessment/Narrative         The placement was negative for: blood aspirated, painful injection and site bleeding       Paresthesias: No.       Bolus given via needle..        Secured via.        Insertion/Infusion Method: Single Shot       Complications: none       Injection made incrementally with aspirations every 5 mL.    Medication(s) Administered   Bupivacaine 0.25% PF (Infiltration) - Perineural   30 mL - 7/15/2022 1:40:00 PM  Bupivacaine liposome (Exparel) 1.3% LA inj susp (Infiltration) - Infiltration   20 mL - 7/15/2022 1:40:00 PM   Comments:  266 mg Exparel used in  bilateral TAP block

## 2022-07-16 NOTE — ANESTHESIA POSTPROCEDURE EVALUATION
Patient: Cheyanne Ramos    Procedure: Procedure(s):  Hysterectomy Total Robot-Assisted Laparoscopic with Salpingectomy, cystoscopy       Anesthesia Type:  General    Note:  Disposition: Outpatient   Postop Pain Control: Uneventful            Sign Out: Well controlled pain   PONV: No   Neuro/Psych: Uneventful            Sign Out: Acceptable/Baseline neuro status   Airway/Respiratory: Uneventful            Sign Out: Acceptable/Baseline resp. status   CV/Hemodynamics: Uneventful            Sign Out: Acceptable CV status; No obvious hypovolemia; No obvious fluid overload   Other NRE: NONE   DID A NON-ROUTINE EVENT OCCUR? No           Last vitals:  Vitals Value Taken Time   /70 07/15/22 1400   Temp 36.2  C (97.2  F) 07/15/22 1400   Pulse 93 07/15/22 1402   Resp 23 07/15/22 1402   SpO2 100 % 07/15/22 1402   Vitals shown include unvalidated device data.    Electronically Signed By: Jhonatan Mcgarry MD  July 16, 2022  3:53 PM

## 2022-07-20 LAB
PATH REPORT.COMMENTS IMP SPEC: NORMAL
PATH REPORT.COMMENTS IMP SPEC: NORMAL
PATH REPORT.FINAL DX SPEC: NORMAL
PATH REPORT.GROSS SPEC: NORMAL
PATH REPORT.MICROSCOPIC SPEC OTHER STN: NORMAL
PATH REPORT.RELEVANT HX SPEC: NORMAL
PHOTO IMAGE: NORMAL

## 2022-08-07 NOTE — PROGRESS NOTES
Cheyanne is a 37 year old who is being evaluated via a billable video visit.      How would you like to obtain your AVS? MyChart  If the video visit is dropped, the invitation should be resent by: Text to cell phone: [phone]  Will anyone else be joining your video visit? Sakshi   Estela Rodgersglenny         Video-Visit Details      Type of service:  Video Visit    Video Time 10 min.    Originating Location (pt. Location): Home    Distant Location (provider location):  [facility]     Platform used for Video Visit: Jose                        Consult Notes on Referred Patient    Date: 22       Dr. Racheal Ribera  [place] [facility]  [address] [place] [address]     RE: Cheyanne Ramos  : 1985  LATHA: 2022      Dear Dr. Racheal Ribera:    I had the pleasure of seeing your patient Cheyanne Ramos here at the Gynecologic Cancer Clinic at the [facility] on 2022.  As you know she is a very pleasant 37 year old woman with a diagnosis of cervical adenocarcinoma in situ.     Today: doing well, felling well overall, minimal pain after first week. occ cramping. Urinating and bowels working well, no bleeding.  Is an . Wounds healing well.    HPI:  Cheyanne Ramos is a  female with history of cervical adenocarcinoma in situ -- CKC in  with negative margins    Hasn't had any abnormal screening/surveillance since we last saw her. She has irregular periods q 14-40 days since chemotherapy treatment. Not having hot flashes or night sweats. She sees Dr. Nevarez ([facility]) q 6 months for follow up of triple neg breast cancer (dx 2018 and underwent chemotherapy, surgery and radiation). Last saw her in February. She's physically active: biking, walking, strength training for exercise. She is not using anything for contraception. Otherwise, denies fever/chills, nausea/emesis, abdominal pain/bloating, abnormal vaginal discharge,  shortness of breath, chest pain, cough, masses/lumps, lower extremity edema.     GYN History:     AIS - Diagnosed and underwent CKC in  with negative margins     6/15/2017 [facility] - Exam under anesthesia, dilatation of cervical canal, endocervical curettings and Pap smear, placement of Connor sleeve  - for cervical stenosis, improve surveillance w/ ECC, at that time desired future fertility     3/10/22: ECC Pap + HPV: BART hrHPV negative, ECC benign scant fragments      -  section x 1, twins   Menses: irregular cycles 14 days to 40 days since chemotherapy   Last pap smear: Date: 3/10/2022  History of abnormal pap smear: Yes, AIS in   Sexually active, no dyspareunia     7/15/22: Operative Procedure:  1.  Robotic assisted hysterectomy, bilateral salpingectomy   2. cystoscopy    Uterus, cervix, bilateral fallopian tubes, robot-assisted laparoscopic total hysterectomy and bilateral salpingectomy:  - Secretory endometrium, negative for hyperplasia and atypia  - Cervix with nabothian cyst, negative for intraepithelial lesion and malignancy  - Two fallopian tubes with no significant histologic abnormality    Review of Systems:    I have reviewed the pertinent positive findings in the review of symptoms with the patient.    Past Medical History:    Past Medical History:   Diagnosis Date     PONV (postoperative nausea and vomiting)      Triple negative malignant neoplasm of breast (H) 2018    Genetic testing negative     Past Surgical History:    Past Surgical History:   Procedure Laterality Date     BREAST BIOPSY, RT/LT        SECTION       COLPOSCOPY CERVIX, BIOPSY CERVIX, ENDOCERVICAL CURETTAGE, COMBINED       CONIZATION      AIS, negative margins     DAVINCI HYSTERECTOMY TOTAL, SALPINGECTOMY BILATERAL N/A 7/15/2022    Procedure: Hysterectomy Total Robot-Assisted Laparoscopic with Salpingectomy, cystoscopy;  Surgeon: Key Crow MD;  [facility]     DILATION AND  CURETTAGE, WITH ULTRASOUND GUIDANCE N/A 06/15/2017    Procedure: DILATION AND CURETTAGE, WITH ULTRASOUND GUIDANCE;  Dilation of Cervical Canal Under Ultrasound Guidance, Endocervical Curettage, Placement of Connor Sleeve and Pap smear;  Surgeon: Kye Crow MD;  [facility]     MASTECTOMY, BILATERAL  2019    w/ reconstruction 2020       Health Maintenance:  Health Maintenance Due   Topic Date Due     PREVENTIVE CARE VISIT  Never done     ADVANCE CARE PLANNING  Never done     HIV SCREENING  Never done     HEPATITIS C SCREENING  Never done     COVID-19 Vaccine (3 - Booster for Moderna series) 2021     PHQ-2 (once per calendar year)  Never done     Current Medications:     has a current medication list which includes the following prescription(s): cetirizine and senna-docusate.     Allergies:     Propoxyphene n-apap and Metronidazole      Social History:     Social History     Tobacco Use     Smoking status: Never Smoker     Smokeless tobacco: Never Used   Substance Use Topics     Alcohol use: Yes     Comment: occasional       History   Drug Use No     Family History:     The patient's family history is notable for:     Family History   Problem Relation Age of Onset     Esophageal Cancer Maternal Grandmother      Breast Cancer No family hx of      Ovarian Cancer No family hx of      Uterine Cancer No family hx of      Colon Cancer No family hx of      Physical Exam:     There were no vitals taken for this visit.  There is no height or weight on file to calculate BMI.    General Appearance: healthy and alert, no distress  No physical exam or VS done due to virtual visit because of COVID 19.      Assessment:    Cheyanne Ramos is a  female with history of cervical adenocarcinoma in situ s/p CKC in  with negative margins, has been undergoing surveillance since that time with colposcopy, ECC and pap smears. Desires definitive management as she's completed childbearing and has underwent treatment  for triple negative breast cancer in 2018.          Plan:     1.)        History of cervical adenocarcinoma in situ s/p CKC in 2014 with negative margins desired definitive treatment now s/p robotic TLH/bilateral salpingectomy. No disease on final pathology.    -Patient should continue with vaginal pap smears due to her hx of AIS. Patient will do this with her primary OB/GYN at [facility]. Recommend continued screening.     2.) Genetic risk factors were assessed:Genetic testing negative    3.) Labs and/or tests ordered include: none     4.) Health maintenance issues addressed today should be performed with general MD      Thank you for allowing us to participate in the care of your patient.       Sincerely,    Key Crow MD    Department of Ob/Gyn and Women's Health  Division of Gynecologic Oncology  [facility]  [phone]    Of a 10 minute appointment, more than 50% was spent in counseling the patient.        CC  Patient Care Team:  Racheal Ratliff as PCP - General (OB/Gyn)  Amalia Salgado RN as Registered Nurse  Key Crow MD as MD (Gynecologic Oncology)  Racheal Ratliff as Referring Physician (OB/Gyn)  Key Crow MD as Assigned Cancer Care Provider  RACHEAL RATLIFF

## 2022-08-08 ENCOUNTER — VIRTUAL VISIT (OUTPATIENT)
Dept: ONCOLOGY | Facility: CLINIC | Age: 37
End: 2022-08-08
Attending: OBSTETRICS & GYNECOLOGY
Payer: COMMERCIAL

## 2022-08-08 DIAGNOSIS — Z98.890 POSTOPERATIVE STATE: ICD-10-CM

## 2022-08-08 DIAGNOSIS — D06.9 ADENOCARCINOMA IN SITU (AIS) OF UTERINE CERVIX: Primary | ICD-10-CM

## 2022-08-08 PROCEDURE — 99024 POSTOP FOLLOW-UP VISIT: CPT | Mod: 95 | Performed by: OBSTETRICS & GYNECOLOGY

## 2022-08-08 NOTE — LETTER
2022         RE: Cheyanne Ramos  [address]  [facility] [address]        Dear Colleague,    Thank you for referring your patient, Cheyanne Ramos, to the [facility]INIC. Please see a copy of my visit note below.      Date: 22       Dr. Racheal Ribera  [facility]  [address]  [place] [address]     RE: Cheyanne Ramos  : 1985  LATHA: 2022      Dear Dr. Racheal Ribera:    I had the pleasure of seeing your patient Cheyanne Ramos here at the Gynecologic Cancer Clinic at the [facility] on 2022.  As you know she is a very pleasant 37 year old woman with a diagnosis of cervical adenocarcinoma in situ.     Today: doing well, felling well overall, minimal pain after first week. occ cramping. Urinating and bowels working well, no bleeding.  Is an . Wounds healing well.    HPI:  Cheyanne Ramos is a  female with history of cervical adenocarcinoma in situ -- CKC in  with negative margins    Hasn't had any abnormal screening/surveillance since we last saw her. She has irregular periods q 14-40 days since chemotherapy treatment. Not having hot flashes or night sweats. She sees Dr. Nevarez ([facility]) q 6 months for follow up of triple neg breast cancer (dx 2018 and underwent chemotherapy, surgery and radiation). Last saw her in February. She's physically active: biking, walking, strength training for exercise. She is not using anything for contraception. Otherwise, denies fever/chills, nausea/emesis, abdominal pain/bloating, abnormal vaginal discharge, shortness of breath, chest pain, cough, masses/lumps, lower extremity edema.     GYN History:     AIS - Diagnosed and underwent CKC in  with negative margins     6/15/2017 [facility] - Exam under anesthesia, dilatation of cervical canal, endocervical curettings and Pap smear, placement of Connor sleeve  - for cervical stenosis, improve surveillance w/ ECC, at that  time desired future fertility     3/10/22: ECC Pap + HPV: BART hrHPV negative, ECC benign scant fragments      -  section x 1, twins   Menses: irregular cycles 14 days to 40 days since chemotherapy   Last pap smear: Date: 3/10/2022  History of abnormal pap smear: Yes, AIS in   Sexually active, no dyspareunia     7/15/22: Operative Procedure:  1.  Robotic assisted hysterectomy, bilateral salpingectomy   2. cystoscopy    Uterus, cervix, bilateral fallopian tubes, robot-assisted laparoscopic total hysterectomy and bilateral salpingectomy:  - Secretory endometrium, negative for hyperplasia and atypia  - Cervix with nabothian cyst, negative for intraepithelial lesion and malignancy  - Two fallopian tubes with no significant histologic abnormality    Review of Systems:    I have reviewed the pertinent positive findings in the review of symptoms with the patient.    Past Medical History:    Past Medical History:   Diagnosis Date     PONV (postoperative nausea and vomiting)      Triple negative malignant neoplasm of breast (H) 2018    Genetic testing negative     Past Surgical History:    Past Surgical History:   Procedure Laterality Date     BREAST BIOPSY, RT/LT        SECTION       COLPOSCOPY CERVIX, BIOPSY CERVIX, ENDOCERVICAL CURETTAGE, COMBINED       CONIZATION      AIS, negative margins     DAVINCI HYSTERECTOMY TOTAL, SALPINGECTOMY BILATERAL N/A 7/15/2022    Procedure: Hysterectomy Total Robot-Assisted Laparoscopic with Salpingectomy, cystoscopy;  Surgeon: Key Crow MD;  [facility]: [facility]     DILATION AND CURETTAGE, WITH ULTRASOUND GUIDANCE N/A 06/15/2017    Procedure: DILATION AND CURETTAGE, WITH ULTRASOUND GUIDANCE;  Dilation of Cervical Canal Under Ultrasound Guidance, Endocervical Curettage, Placement of Connor Sleeve and Pap smear;  Surgeon: Key Crow MD;  [facility]     MASTECTOMY, BILATERAL  2019    w/ reconstruction 2020       [facility]  Maintenance:  Health Maintenance Due   Topic Date Due     PREVENTIVE CARE VISIT  Never done     ADVANCE CARE PLANNING  Never done     HIV SCREENING  Never done     HEPATITIS C SCREENING  Never done     COVID-19 Vaccine (3 - Booster for Moderna series) 2021     PHQ-2 (once per calendar year)  Never done     Current Medications:     has a current medication list which includes the following prescription(s): cetirizine and senna-docusate.     Allergies:     Propoxyphene n-apap and Metronidazole      Social History:     Social History     Tobacco Use     Smoking status: Never Smoker     Smokeless tobacco: Never Used   Substance Use Topics     Alcohol use: Yes     Comment: occasional       History   Drug Use No     Family History:     The patient's family history is notable for:     Family History   Problem Relation Age of Onset     Esophageal Cancer Maternal Grandmother      Breast Cancer No family hx of      Ovarian Cancer No family hx of      Uterine Cancer No family hx of      Colon Cancer No family hx of      Physical Exam:     There were no vitals taken for this visit.  There is no height or weight on file to calculate BMI.    General Appearance: healthy and alert, no distress  No physical exam or VS done due to virtual visit because of COVID 19.      Assessment:    Cheyanne Ramos is a  female with history of cervical adenocarcinoma in situ s/p CKC in  with negative margins, has been undergoing surveillance since that time with colposcopy, ECC and pap smears. Desires definitive management as she's completed childbearing and has underwent treatment for triple negative breast cancer in 2018.          Plan:     1.)        History of cervical adenocarcinoma in situ s/p CKC in  with negative margins desired definitive treatment now s/p robotic TLH/bilateral salpingectomy. No disease on final pathology.    -Patient should continue with vaginal pap smears due to her hx of AIS. Patient will do this with  her primary OB/GYN at [facility]. Recommend continued screening.     2.) Genetic risk factors were assessed:Genetic testing negative    3.) Labs and/or tests ordered include: none     4.) Health maintenance issues addressed today should be performed with general MD      Thank you for allowing us to participate in the care of your patient.       Sincerely,    Key Crow MD    Department of Ob/Gyn and Women's Health  Division of Gynecologic Oncology  [facility]  [phone]    Of a 10 minute appointment, more than 50% was spent in counseling the patient.        CC  Patient Care Team:  Racheal Ribera as PCP - General (OB/Gyn)  Amalia Salgado, RN as Registered Nurse

## 2022-08-15 NOTE — PATIENT INSTRUCTIONS
It was a pleasure seeing you in clinic today-please reach out if there are any further questions that arise following today's visit.  During business hours, you may reach me at [phone].  For urgent/emergent questions after business hours, you may reach the on-call Gynecologic Oncology Resident through the [facility] at [phone].    All normal test results are usually communicated via letter or [employer] message.  Abnormal results (those that require a change in the previously discussed plan of care) are usually communicated via a phone call.    I recommend signing up for [employer] access if you have not already done so.  This allows you online access to your lab results and also helps you communicate efficiently with your clinic should any questions arise in your care.    No follow-up in gyn oncology  Continue regular screening visits with primary care and gynecology    Dr Mignon Beatty RN  Phone:  [phone]  Fax:  [phone]

## 2022-09-11 ENCOUNTER — HEALTH MAINTENANCE LETTER (OUTPATIENT)
Age: 37
End: 2022-09-11

## 2023-05-06 ENCOUNTER — HEALTH MAINTENANCE LETTER (OUTPATIENT)
Age: 38
End: 2023-05-06

## 2024-07-13 ENCOUNTER — HEALTH MAINTENANCE LETTER (OUTPATIENT)
Age: 39
End: 2024-07-13

## 2025-07-19 ENCOUNTER — HEALTH MAINTENANCE LETTER (OUTPATIENT)
Age: 40
End: 2025-07-19

## (undated) DEVICE — SYSTEM CLEARIFY VISUALIZATION 21-345

## (undated) DEVICE — SUCTION MANIFOLD NEPTUNE 2 SYS 4 PORT 0702-020-000

## (undated) DEVICE — SU VICRYL 2-0 CT-2 27" UND J269H

## (undated) DEVICE — ESU GROUND PAD ADULT W/CORD E7507

## (undated) DEVICE — SYR 70ML TOOMEY 041170

## (undated) DEVICE — DRAPE MAYO STAND 23X54 8337

## (undated) DEVICE — LINEN TOWEL PACK X6 WHITE 5487

## (undated) DEVICE — LINEN TOWEL PACK X30 5481

## (undated) DEVICE — DRAPE SHEET REV FOLD 3/4 9349

## (undated) DEVICE — SU WND CLOSURE VLOC 180 ABS 0 9" GS-21 VLOCL0346

## (undated) DEVICE — CATH FOLYSIL 16FR 15ML AA6116

## (undated) DEVICE — KIT PATIENT POSITIONING PIGAZZI LATEX FREE 40580

## (undated) DEVICE — DRSG PRIMAPORE 02X3" 7133

## (undated) DEVICE — KOH COLPOTOMIZER OCCLUDER  CPO-6

## (undated) DEVICE — SYR 10ML FINGER CONTROL W/O NDL 309695

## (undated) DEVICE — GLOVE PROTEXIS BLUE W/NEU-THERA 7.0  2D73EB70

## (undated) DEVICE — DAVINCI OBTURATOR 8MM BLUNT LONG 470009

## (undated) DEVICE — DAVINCI XI DRAPE COLUMN 470341

## (undated) DEVICE — DAVINCI XI SEAL UNIVERSAL 5-8MM 470361

## (undated) DEVICE — PROTECTOR ARM ONE-STEP TRENDELENBURG 40418

## (undated) DEVICE — SOL ADH LIQUID BENZOIN SWAB 0.6ML C1544

## (undated) DEVICE — WIPES FOLEY CARE SURESTEP PROVON DFC100

## (undated) DEVICE — SU VICRYL 4-0 PS-2 18" UND J496H

## (undated) DEVICE — PACK GOWN 3/PK DISP XL SBA32GPFCB

## (undated) DEVICE — NDL INSUFFLATION 13GA 120MM C2201

## (undated) DEVICE — Device

## (undated) DEVICE — DAVINCI XI DRAPE ARM 470015

## (undated) DEVICE — SU VICRYL 2-0 CT-2 27" J333H

## (undated) DEVICE — DAVINCI HOT SHEARS TIP COVER  400180

## (undated) DEVICE — SPECIMEN TRAP MUCOUS 40ML LUKI C30200A

## (undated) DEVICE — GLOVE PROTEXIS W/NEU-THERA 6.5  2D73TE65

## (undated) DEVICE — SPONGE RAY-TEC 4X8" 7318

## (undated) DEVICE — SOL NACL 0.9% IRRIG 1000ML BOTTLE 2F7124

## (undated) DEVICE — LINEN TOWEL PACK X5 5464

## (undated) DEVICE — PREP TECHNI-CARE CHLOROXYLENOL 3% 4OZ BOTTLE C222-4ZWO

## (undated) DEVICE — JELLY LUBRICATING SURGILUBE 2OZ TUBE

## (undated) DEVICE — SUCTION IRR STRYKERFLOW II W/TIP 250-070-520

## (undated) RX ORDER — HYDROMORPHONE HYDROCHLORIDE 1 MG/ML
INJECTION, SOLUTION INTRAMUSCULAR; INTRAVENOUS; SUBCUTANEOUS
Status: DISPENSED
Start: 2022-07-15

## (undated) RX ORDER — KETOROLAC TROMETHAMINE 30 MG/ML
INJECTION, SOLUTION INTRAMUSCULAR; INTRAVENOUS
Status: DISPENSED
Start: 2022-07-15

## (undated) RX ORDER — GLYCOPYRROLATE 0.2 MG/ML
INJECTION INTRAMUSCULAR; INTRAVENOUS
Status: DISPENSED
Start: 2022-07-15

## (undated) RX ORDER — CEFAZOLIN SODIUM 1 G/3ML
INJECTION, POWDER, FOR SOLUTION INTRAMUSCULAR; INTRAVENOUS
Status: DISPENSED
Start: 2022-07-15

## (undated) RX ORDER — SCOLOPAMINE TRANSDERMAL SYSTEM 1 MG/1
PATCH, EXTENDED RELEASE TRANSDERMAL
Status: DISPENSED
Start: 2022-07-15

## (undated) RX ORDER — PROPOFOL 10 MG/ML
INJECTION, EMULSION INTRAVENOUS
Status: DISPENSED
Start: 2022-07-15

## (undated) RX ORDER — PROPOFOL 10 MG/ML
INJECTION, EMULSION INTRAVENOUS
Status: DISPENSED
Start: 2017-06-15

## (undated) RX ORDER — ONDANSETRON 2 MG/ML
INJECTION INTRAMUSCULAR; INTRAVENOUS
Status: DISPENSED
Start: 2017-06-15

## (undated) RX ORDER — KETOROLAC TROMETHAMINE 30 MG/ML
INJECTION, SOLUTION INTRAMUSCULAR; INTRAVENOUS
Status: DISPENSED
Start: 2017-06-15

## (undated) RX ORDER — FENTANYL CITRATE 50 UG/ML
INJECTION, SOLUTION INTRAMUSCULAR; INTRAVENOUS
Status: DISPENSED
Start: 2022-07-15

## (undated) RX ORDER — ACETAMINOPHEN 325 MG/1
TABLET ORAL
Status: DISPENSED
Start: 2022-07-15

## (undated) RX ORDER — DEXAMETHASONE SODIUM PHOSPHATE 4 MG/ML
INJECTION, SOLUTION INTRA-ARTICULAR; INTRALESIONAL; INTRAMUSCULAR; INTRAVENOUS; SOFT TISSUE
Status: DISPENSED
Start: 2022-07-15

## (undated) RX ORDER — GABAPENTIN 300 MG/1
CAPSULE ORAL
Status: DISPENSED
Start: 2017-06-15

## (undated) RX ORDER — DEXAMETHASONE SODIUM PHOSPHATE 4 MG/ML
INJECTION, SOLUTION INTRA-ARTICULAR; INTRALESIONAL; INTRAMUSCULAR; INTRAVENOUS; SOFT TISSUE
Status: DISPENSED
Start: 2017-06-15

## (undated) RX ORDER — ACETAMINOPHEN 325 MG/1
TABLET ORAL
Status: DISPENSED
Start: 2017-06-15

## (undated) RX ORDER — KETAMINE HYDROCHLORIDE 10 MG/ML
INJECTION, SOLUTION INTRAMUSCULAR; INTRAVENOUS
Status: DISPENSED
Start: 2017-06-15

## (undated) RX ORDER — ONDANSETRON 4 MG/1
TABLET, ORALLY DISINTEGRATING ORAL
Status: DISPENSED
Start: 2022-07-15

## (undated) RX ORDER — ONDANSETRON 2 MG/ML
INJECTION INTRAMUSCULAR; INTRAVENOUS
Status: DISPENSED
Start: 2022-07-15

## (undated) RX ORDER — HEPARIN SODIUM 10000 [USP'U]/ML
INJECTION, SOLUTION INTRAVENOUS; SUBCUTANEOUS
Status: DISPENSED
Start: 2022-07-15